# Patient Record
Sex: FEMALE | Race: WHITE | NOT HISPANIC OR LATINO | Employment: OTHER | URBAN - METROPOLITAN AREA
[De-identification: names, ages, dates, MRNs, and addresses within clinical notes are randomized per-mention and may not be internally consistent; named-entity substitution may affect disease eponyms.]

---

## 2023-04-14 ENCOUNTER — APPOINTMENT (OUTPATIENT)
Dept: PRIMARY CARE | Facility: CLINIC | Age: 63
End: 2023-04-14
Payer: COMMERCIAL

## 2023-04-22 PROBLEM — E03.9 HYPOTHYROIDISM: Status: ACTIVE | Noted: 2023-04-22

## 2023-04-22 PROBLEM — M54.16 LUMBAR RADICULOPATHY: Status: ACTIVE | Noted: 2023-04-22

## 2023-04-22 PROBLEM — M41.9 SCOLIOSIS: Status: ACTIVE | Noted: 2023-04-22

## 2023-04-22 PROBLEM — M43.10 SPONDYLOLISTHESIS, ACQUIRED: Status: ACTIVE | Noted: 2023-04-22

## 2023-04-22 PROBLEM — G90.50 RSD (REFLEX SYMPATHETIC DYSTROPHY): Status: ACTIVE | Noted: 2023-04-22

## 2023-04-22 PROBLEM — G56.00 CARPAL TUNNEL SYNDROME: Status: ACTIVE | Noted: 2023-04-22

## 2023-04-22 PROBLEM — F31.10 BIPOLAR DISORDER, CURRENT EPISODE MANIC WITHOUT PSYCHOTIC FEATURES (MULTI): Status: ACTIVE | Noted: 2023-04-22

## 2023-04-22 PROBLEM — M47.816 LUMBAR SPONDYLOSIS: Status: ACTIVE | Noted: 2023-04-22

## 2023-04-22 PROBLEM — R51.9 HEADACHE: Status: ACTIVE | Noted: 2023-04-22

## 2023-04-22 PROBLEM — I95.9 HYPOTENSION: Status: ACTIVE | Noted: 2023-04-22

## 2023-04-22 PROBLEM — G40.909 SEIZURE DISORDER (MULTI): Status: ACTIVE | Noted: 2023-04-22

## 2023-04-22 PROBLEM — R26.9 GAIT DISTURBANCE: Status: ACTIVE | Noted: 2023-04-22

## 2023-04-22 PROBLEM — R53.83 MALAISE AND FATIGUE: Status: ACTIVE | Noted: 2023-04-22

## 2023-04-22 PROBLEM — G90.529 COMPLEX REGIONAL PAIN SYNDROME OF LOWER EXTREMITY: Status: ACTIVE | Noted: 2023-04-22

## 2023-04-22 PROBLEM — D51.0 ANEMIA, PERNICIOUS: Status: ACTIVE | Noted: 2023-04-22

## 2023-04-22 PROBLEM — L40.9 PSORIASIS: Status: ACTIVE | Noted: 2023-04-22

## 2023-04-22 PROBLEM — R29.6 FALLS FREQUENTLY: Status: ACTIVE | Noted: 2023-04-22

## 2023-04-22 PROBLEM — R53.81 MALAISE AND FATIGUE: Status: ACTIVE | Noted: 2023-04-22

## 2023-04-22 PROBLEM — N28.9 RENAL DYSFUNCTION: Status: ACTIVE | Noted: 2023-04-22

## 2023-04-22 PROBLEM — M54.50 LUMBAGO: Status: ACTIVE | Noted: 2023-04-22

## 2023-04-22 RX ORDER — TIZANIDINE 4 MG/1
TABLET ORAL
COMMUNITY
Start: 2015-08-22

## 2023-04-22 RX ORDER — MIRTAZAPINE 15 MG/1
TABLET, FILM COATED ORAL
COMMUNITY
Start: 2022-06-19 | End: 2023-04-25 | Stop reason: SDUPTHER

## 2023-04-22 RX ORDER — LEVOTHYROXINE SODIUM 25 UG/1
1 TABLET ORAL DAILY
COMMUNITY
Start: 2023-04-06 | End: 2023-04-25 | Stop reason: SDUPTHER

## 2023-04-22 RX ORDER — GABAPENTIN 100 MG/1
CAPSULE ORAL
COMMUNITY
Start: 2022-06-19 | End: 2023-04-25 | Stop reason: SDUPTHER

## 2023-04-22 RX ORDER — DIVALPROEX SODIUM 250 MG/1
1 TABLET, DELAYED RELEASE ORAL 3 TIMES DAILY
COMMUNITY
Start: 2022-07-22 | End: 2023-04-25 | Stop reason: SDUPTHER

## 2023-04-22 RX ORDER — DIAZEPAM 10 MG/1
1 TABLET ORAL 2 TIMES DAILY
COMMUNITY
Start: 2019-12-13

## 2023-04-22 RX ORDER — BUSPIRONE HYDROCHLORIDE 10 MG/1
TABLET ORAL 2 TIMES DAILY
COMMUNITY
End: 2024-01-05 | Stop reason: WASHOUT

## 2023-04-22 RX ORDER — TRAZODONE HYDROCHLORIDE 50 MG/1
50 TABLET ORAL
COMMUNITY
End: 2023-04-25 | Stop reason: ALTCHOICE

## 2023-04-22 RX ORDER — COLCHICINE 0.6 MG/1
1 TABLET ORAL DAILY
COMMUNITY
Start: 2023-04-06 | End: 2023-07-24

## 2023-04-22 RX ORDER — LEVOTHYROXINE SODIUM 75 UG/1
1 TABLET ORAL DAILY
COMMUNITY
Start: 2019-08-23 | End: 2023-09-05 | Stop reason: SDUPTHER

## 2023-04-22 RX ORDER — MIDODRINE HYDROCHLORIDE 10 MG/1
1 TABLET ORAL 3 TIMES DAILY
COMMUNITY
Start: 2022-07-22 | End: 2024-05-16 | Stop reason: ALTCHOICE

## 2023-04-22 RX ORDER — TRAMADOL HYDROCHLORIDE 50 MG/1
50 TABLET ORAL
COMMUNITY
End: 2023-04-25 | Stop reason: ALTCHOICE

## 2023-04-22 RX ORDER — QUETIAPINE FUMARATE 25 MG/1
1 TABLET, FILM COATED ORAL DAILY
COMMUNITY
Start: 2023-04-06 | End: 2023-04-25 | Stop reason: SDUPTHER

## 2023-04-22 RX ORDER — DIVALPROEX SODIUM 125 MG/1
250 CAPSULE, COATED PELLETS ORAL EVERY 8 HOURS
COMMUNITY
Start: 2022-06-19 | End: 2024-01-05 | Stop reason: SDUPTHER

## 2023-04-22 RX ORDER — ARIPIPRAZOLE 5 MG/1
TABLET ORAL
COMMUNITY
End: 2023-04-25 | Stop reason: ALTCHOICE

## 2023-04-25 ENCOUNTER — LAB (OUTPATIENT)
Dept: LAB | Facility: LAB | Age: 63
End: 2023-04-25
Payer: COMMERCIAL

## 2023-04-25 ENCOUNTER — OFFICE VISIT (OUTPATIENT)
Dept: PRIMARY CARE | Facility: CLINIC | Age: 63
End: 2023-04-25
Payer: COMMERCIAL

## 2023-04-25 VITALS
BODY MASS INDEX: 29.71 KG/M2 | DIASTOLIC BLOOD PRESSURE: 71 MMHG | WEIGHT: 174 LBS | HEIGHT: 64 IN | SYSTOLIC BLOOD PRESSURE: 102 MMHG

## 2023-04-25 DIAGNOSIS — R26.9 GAIT DISTURBANCE: ICD-10-CM

## 2023-04-25 DIAGNOSIS — E03.9 HYPOTHYROIDISM, UNSPECIFIED TYPE: ICD-10-CM

## 2023-04-25 DIAGNOSIS — F31.10 BIPOLAR DISORDER, CURRENT EPISODE MANIC WITHOUT PSYCHOTIC FEATURES (MULTI): ICD-10-CM

## 2023-04-25 DIAGNOSIS — G40.909 SEIZURE DISORDER (MULTI): ICD-10-CM

## 2023-04-25 DIAGNOSIS — G90.50 RSD (REFLEX SYMPATHETIC DYSTROPHY): Primary | ICD-10-CM

## 2023-04-25 DIAGNOSIS — G93.40 ENCEPHALOPATHY: ICD-10-CM

## 2023-04-25 LAB
ALANINE AMINOTRANSFERASE (SGPT) (U/L) IN SER/PLAS: 5 U/L (ref 7–45)
ALBUMIN (G/DL) IN SER/PLAS: 4.5 G/DL (ref 3.4–5)
ALKALINE PHOSPHATASE (U/L) IN SER/PLAS: 66 U/L (ref 33–136)
ANION GAP IN SER/PLAS: 17 MMOL/L (ref 10–20)
ASPARTATE AMINOTRANSFERASE (SGOT) (U/L) IN SER/PLAS: 15 U/L (ref 9–39)
BILIRUBIN TOTAL (MG/DL) IN SER/PLAS: 0.6 MG/DL (ref 0–1.2)
CALCIUM (MG/DL) IN SER/PLAS: 9.7 MG/DL (ref 8.6–10.6)
CARBON DIOXIDE, TOTAL (MMOL/L) IN SER/PLAS: 25 MMOL/L (ref 21–32)
CHLORIDE (MMOL/L) IN SER/PLAS: 103 MMOL/L (ref 98–107)
CREATININE (MG/DL) IN SER/PLAS: 1.2 MG/DL (ref 0.5–1.05)
ERYTHROCYTE DISTRIBUTION WIDTH (RATIO) BY AUTOMATED COUNT: 13.5 % (ref 11.5–14.5)
ERYTHROCYTE MEAN CORPUSCULAR HEMOGLOBIN CONCENTRATION (G/DL) BY AUTOMATED: 31.3 G/DL (ref 32–36)
ERYTHROCYTE MEAN CORPUSCULAR VOLUME (FL) BY AUTOMATED COUNT: 102 FL (ref 80–100)
ERYTHROCYTES (10*6/UL) IN BLOOD BY AUTOMATED COUNT: 3.93 X10E12/L (ref 4–5.2)
GFR FEMALE: 51 ML/MIN/1.73M2
GLUCOSE (MG/DL) IN SER/PLAS: 82 MG/DL (ref 74–99)
HEMATOCRIT (%) IN BLOOD BY AUTOMATED COUNT: 39.9 % (ref 36–46)
HEMOGLOBIN (G/DL) IN BLOOD: 12.5 G/DL (ref 12–16)
LEUKOCYTES (10*3/UL) IN BLOOD BY AUTOMATED COUNT: 7.2 X10E9/L (ref 4.4–11.3)
NRBC (PER 100 WBCS) BY AUTOMATED COUNT: 0 /100 WBC (ref 0–0)
PLATELETS (10*3/UL) IN BLOOD AUTOMATED COUNT: 265 X10E9/L (ref 150–450)
POTASSIUM (MMOL/L) IN SER/PLAS: 4.8 MMOL/L (ref 3.5–5.3)
PROTEIN TOTAL: 7.4 G/DL (ref 6.4–8.2)
SODIUM (MMOL/L) IN SER/PLAS: 140 MMOL/L (ref 136–145)
THYROTROPIN (MIU/L) IN SER/PLAS BY DETECTION LIMIT <= 0.05 MIU/L: 2.49 MIU/L (ref 0.44–3.98)
UREA NITROGEN (MG/DL) IN SER/PLAS: 31 MG/DL (ref 6–23)

## 2023-04-25 PROCEDURE — 85027 COMPLETE CBC AUTOMATED: CPT

## 2023-04-25 PROCEDURE — 36415 COLL VENOUS BLD VENIPUNCTURE: CPT

## 2023-04-25 PROCEDURE — 80053 COMPREHEN METABOLIC PANEL: CPT

## 2023-04-25 PROCEDURE — 3008F BODY MASS INDEX DOCD: CPT | Performed by: INTERNAL MEDICINE

## 2023-04-25 PROCEDURE — 84443 ASSAY THYROID STIM HORMONE: CPT

## 2023-04-25 PROCEDURE — 99215 OFFICE O/P EST HI 40 MIN: CPT | Performed by: INTERNAL MEDICINE

## 2023-04-25 RX ORDER — MIRTAZAPINE 15 MG/1
15 TABLET, FILM COATED ORAL NIGHTLY
Qty: 30 TABLET | Refills: 0 | Status: SHIPPED | OUTPATIENT
Start: 2023-04-25 | End: 2024-01-05 | Stop reason: WASHOUT

## 2023-04-25 RX ORDER — DIVALPROEX SODIUM 250 MG/1
250 TABLET, DELAYED RELEASE ORAL 3 TIMES DAILY
Qty: 90 TABLET | Refills: 1 | Status: SHIPPED | OUTPATIENT
Start: 2023-04-25 | End: 2023-06-13

## 2023-04-25 RX ORDER — QUETIAPINE FUMARATE 25 MG/1
25 TABLET, FILM COATED ORAL DAILY
Qty: 30 TABLET | Refills: 0 | Status: SHIPPED | OUTPATIENT
Start: 2023-04-25 | End: 2024-04-10 | Stop reason: WASHOUT

## 2023-04-25 RX ORDER — GABAPENTIN 100 MG/1
100 CAPSULE ORAL 3 TIMES DAILY
Qty: 90 CAPSULE | Refills: 0 | Status: SHIPPED | OUTPATIENT
Start: 2023-04-25 | End: 2023-06-13

## 2023-04-25 RX ORDER — LEVOTHYROXINE SODIUM 25 UG/1
25 TABLET ORAL DAILY
Qty: 30 TABLET | Refills: 1 | Status: SHIPPED | OUTPATIENT
Start: 2023-04-25 | End: 2024-01-05 | Stop reason: ALTCHOICE

## 2023-04-25 ASSESSMENT — ENCOUNTER SYMPTOMS
HEADACHES: 0
SPEECH DIFFICULTY: 0
NECK PAIN: 0
WEAKNESS: 0
CHILLS: 0
ABDOMINAL PAIN: 0
COUGH: 0
DIFFICULTY URINATING: 0
DIARRHEA: 0
FREQUENCY: 0
TREMORS: 0
BACK PAIN: 0
SLEEP DISTURBANCE: 0
PALPITATIONS: 0
SHORTNESS OF BREATH: 0
ARTHRALGIAS: 0
BLOOD IN STOOL: 0
FEVER: 0
CONSTIPATION: 0
FATIGUE: 1
MYALGIAS: 0
DIZZINESS: 0

## 2023-04-25 NOTE — PATIENT INSTRUCTIONS
Seen today for recent hospitalization for encephalopathy  Your medications have been changed.  New medications have been sent to your mail-in pharmacy.  Follow-up with your PCP in 1 to 2 months  Follow-up with neurology in 1 month  Follow-up with your psychiatrist in 1 month

## 2023-04-25 NOTE — PROGRESS NOTES
Subjective   Patient ID: Corinne L Tanks is a 63 y.o. female.    HPI patient is seen today for follow-up after hospitalization.  Her medical history significant for bipolar disorder, cervical canal stenosis, seizures, gait imbalance.  She went to the ER after being unresponsive found by staff.  There was a concern for polypharmacy/serotonin syndrome given patient was on multiple sedating medications.  In the hospital her medications were reviewed and changed.   She was found to have cogwheel rigidity and tremors possibly due to Abilify which was later discontinued and she was started on Seroquel.  Her discharge medications are-divalproex 125 mg - 2 capsule every 8 hours, gabapentin 100 mg 3 times a day, mirtazapine 50 mg at bedtime, quetiapine 25 mg once a day, levothyroxine 25 mcg once a day.    Review of Systems   Constitutional:  Positive for fatigue. Negative for chills and fever.   Respiratory:  Negative for cough and shortness of breath.    Cardiovascular:  Negative for chest pain, palpitations and leg swelling.   Gastrointestinal:  Negative for abdominal pain, blood in stool, constipation and diarrhea.   Endocrine: Negative for cold intolerance and heat intolerance.   Genitourinary:  Negative for difficulty urinating, frequency and urgency.   Musculoskeletal:  Negative for arthralgias, back pain, myalgias and neck pain.   Neurological:  Negative for dizziness, tremors, speech difficulty, weakness and headaches.   Psychiatric/Behavioral:  Negative for sleep disturbance.        Objective   Physical Exam  Vitals reviewed.   Constitutional:       General: She is not in acute distress.     Appearance: Normal appearance.   HENT:      Head: Normocephalic and atraumatic.      Mouth/Throat:      Mouth: Mucous membranes are moist.   Eyes:      Extraocular Movements: Extraocular movements intact.      Conjunctiva/sclera: Conjunctivae normal.      Pupils: Pupils are equal, round, and reactive to light.   Cardiovascular:       Rate and Rhythm: Normal rate and regular rhythm.      Pulses: Normal pulses.   Pulmonary:      Effort: Pulmonary effort is normal. No respiratory distress.      Breath sounds: Normal breath sounds.   Abdominal:      General: Bowel sounds are normal. There is no distension.      Tenderness: There is no abdominal tenderness.   Musculoskeletal:         General: No swelling or tenderness. Normal range of motion.      Cervical back: Normal range of motion.   Skin:     General: Skin is warm.   Neurological:      General: No focal deficit present.      Mental Status: She is alert.      Motor: Weakness present.      Coordination: Coordination normal.      Gait: Gait abnormal.   Psychiatric:         Mood and Affect: Mood normal.         Behavior: Behavior normal.         Assessment/Plan   Diagnoses and all orders for this visit:  RSD (reflex sympathetic dystrophy)  -     gabapentin (Neurontin) 100 mg capsule; Take 1 capsule (100 mg) by mouth in the morning and 1 capsule (100 mg) in the evening and 1 capsule (100 mg) before bedtime.  Hypothyroidism, unspecified type  Comments:  Currently on levothyroxine 25 mcg daily  Orders:  -     levothyroxine (Synthroid, Levoxyl) 25 mcg tablet; Take 1 tablet (25 mcg) by mouth once daily.  -     TSH with reflex to Free T4 if abnormal; Future  Bipolar disorder, current episode manic without psychotic features (CMS/[facility])  Comments:  Continue with current pain  Patient has appointment to see her psychiatrist  Orders:  -     mirtazapine (Remeron) 15 mg tablet; Take 1 tablet (15 mg) by mouth once daily at bedtime.  -     QUEtiapine (SEROquel) 25 mg tablet; Take 1 tablet (25 mg) by mouth once daily.  Gait disturbance  Comments:  Continue using walker  Fall and safety precautions  Seizure disorder (CMS/HCC)  Comments:  Continue with divalproex 250 mg every 8 hours  Patient has appointment to see neurology next month  Orders:  -     divalproex (Depakote) 250 mg EC tablet; Take 1 tablet (250  mg) by mouth in the morning and 1 tablet (250 mg) in the evening and 1 tablet (250 mg) before bedtime.  -     CBC; Future  -     Comprehensive Metabolic Panel; Future  Encephalopathy  Comments:  Recent hospitalization due to encephalopathy possibly due to overdose of her regular medications  Medications weaned down

## 2023-04-28 ENCOUNTER — TELEPHONE (OUTPATIENT)
Dept: PRIMARY CARE | Facility: CLINIC | Age: 63
End: 2023-04-28
Payer: COMMERCIAL

## 2023-06-12 DIAGNOSIS — G90.50 RSD (REFLEX SYMPATHETIC DYSTROPHY): ICD-10-CM

## 2023-06-12 DIAGNOSIS — G40.909 SEIZURE DISORDER (MULTI): ICD-10-CM

## 2023-06-13 RX ORDER — GABAPENTIN 100 MG/1
CAPSULE ORAL
Qty: 21 CAPSULE | Status: SHIPPED | OUTPATIENT
Start: 2023-06-13 | End: 2024-01-05 | Stop reason: SDUPTHER

## 2023-06-13 RX ORDER — DIVALPROEX SODIUM 250 MG/1
TABLET, DELAYED RELEASE ORAL
Qty: 21 TABLET | Status: SHIPPED | OUTPATIENT
Start: 2023-06-13 | End: 2024-01-05 | Stop reason: WASHOUT

## 2023-07-24 DIAGNOSIS — M10.9 ACUTE GOUT, UNSPECIFIED CAUSE, UNSPECIFIED SITE: Primary | ICD-10-CM

## 2023-07-24 RX ORDER — COLCHICINE 0.6 MG/1
TABLET ORAL
Qty: 7 TABLET | Refills: 11 | Status: SHIPPED | OUTPATIENT
Start: 2023-07-24 | End: 2023-11-07

## 2023-09-05 DIAGNOSIS — E03.9 HYPOTHYROIDISM, UNSPECIFIED TYPE: ICD-10-CM

## 2023-09-05 RX ORDER — LEVOTHYROXINE SODIUM 75 UG/1
75 TABLET ORAL DAILY
Qty: 90 TABLET | Refills: 3 | Status: SHIPPED | OUTPATIENT
Start: 2023-09-05 | End: 2024-01-05 | Stop reason: ALTCHOICE

## 2023-09-08 DIAGNOSIS — E03.9 HYPOTHYROIDISM, UNSPECIFIED TYPE: ICD-10-CM

## 2023-09-11 RX ORDER — MIDODRINE HYDROCHLORIDE 10 MG/1
TABLET ORAL
Qty: 21 TABLET | OUTPATIENT
Start: 2023-09-11

## 2023-10-19 ENCOUNTER — TELEPHONE (OUTPATIENT)
Dept: PRIMARY CARE | Facility: CLINIC | Age: 63
End: 2023-10-19
Payer: COMMERCIAL

## 2023-10-19 NOTE — TELEPHONE ENCOUNTER
Patient is in Assistant Living where they are making her walk down to the nurses station for her medication. She states that her neuropathy is so bad that she can't do it even with her Rolator. She would like for us to call the assistant living facility and inform them she an not walk to the nursing station for her meds 3 to 4 times a day. While you were out Dr. Pandey but her down too 100mg of her Gabapentin and she doesn't know why and thinks she should go back to her original dosage.     [facility] in [place].

## 2023-10-20 NOTE — TELEPHONE ENCOUNTER
I have not seen this patient in almost a year, Dr. Pandey did not change anything she was discharged from the hospital over the summer with this medication dose.  To make medication adjustments I am not comfortable doing it until I have a chance to reevaluate the patient.  She has an appointment coming up it seems in November that will be reviewed at that time

## 2023-10-25 NOTE — TELEPHONE ENCOUNTER
[facility] wanted to inform us that Patient fell leaving the building with no injuries.

## 2023-11-06 DIAGNOSIS — M10.9 ACUTE GOUT, UNSPECIFIED CAUSE, UNSPECIFIED SITE: ICD-10-CM

## 2023-11-07 RX ORDER — COLCHICINE 0.6 MG/1
TABLET ORAL
Qty: 30 TABLET | Refills: 2 | Status: SHIPPED | OUTPATIENT
Start: 2023-11-07 | End: 2024-02-08 | Stop reason: SDUPTHER

## 2023-11-16 ENCOUNTER — APPOINTMENT (OUTPATIENT)
Dept: PRIMARY CARE | Facility: CLINIC | Age: 63
End: 2023-11-16
Payer: COMMERCIAL

## 2023-12-07 ENCOUNTER — TELEPHONE (OUTPATIENT)
Dept: PRIMARY CARE | Facility: CLINIC | Age: 63
End: 2023-12-07
Payer: COMMERCIAL

## 2023-12-07 NOTE — TELEPHONE ENCOUNTER
Patient is requesting a letter for her Assistant Living Home for her meals and medication be brought up to her instead of walking to them. She is in extreme pain and can't walk down.

## 2023-12-11 DIAGNOSIS — G40.909 SEIZURE DISORDER (MULTI): Primary | ICD-10-CM

## 2023-12-12 RX ORDER — LAMOTRIGINE 25 MG/1
TABLET ORAL
Qty: 60 TABLET | Refills: 1 | Status: SHIPPED | OUTPATIENT
Start: 2023-12-12 | End: 2024-01-05 | Stop reason: ALTCHOICE

## 2023-12-14 NOTE — TELEPHONE ENCOUNTER
Patient has not been seen in the year and wants to actually see her at her upcoming appointment before a feel comfortable providing a letter certifying a special accommodation.  I certainly empathize with the idea that she does not want to be an unnecessary pain but I worry that constant immobility will make her weaker and decline faster as well.  There may be other ways we can intervene to compensate for this issue.

## 2023-12-22 RX ORDER — LURASIDONE HYDROCHLORIDE 20 MG/1
20 TABLET, FILM COATED ORAL
COMMUNITY
Start: 2023-12-13 | End: 2024-01-05 | Stop reason: SDUPTHER

## 2023-12-25 RX ORDER — LURASIDONE HYDROCHLORIDE 20 MG/1
20 TABLET, FILM COATED ORAL
Qty: 30 TABLET | Refills: 11 | OUTPATIENT
Start: 2023-12-25 | End: 2024-12-19

## 2024-01-02 RX ORDER — LURASIDONE HYDROCHLORIDE 20 MG/1
TABLET, FILM COATED ORAL
Qty: 30 TABLET | Refills: 11 | OUTPATIENT
Start: 2024-01-02

## 2024-01-05 ENCOUNTER — PHARMACY VISIT (OUTPATIENT)
Dept: PHARMACY | Facility: CLINIC | Age: 64
End: 2024-01-05
Payer: COMMERCIAL

## 2024-01-05 ENCOUNTER — LAB (OUTPATIENT)
Dept: LAB | Facility: LAB | Age: 64
End: 2024-01-05
Payer: COMMERCIAL

## 2024-01-05 ENCOUNTER — OFFICE VISIT (OUTPATIENT)
Dept: PRIMARY CARE | Facility: CLINIC | Age: 64
End: 2024-01-05
Payer: COMMERCIAL

## 2024-01-05 VITALS
HEART RATE: 98 BPM | SYSTOLIC BLOOD PRESSURE: 125 MMHG | DIASTOLIC BLOOD PRESSURE: 79 MMHG | WEIGHT: 129.6 LBS | BODY MASS INDEX: 22.13 KG/M2 | HEIGHT: 64 IN | TEMPERATURE: 98 F

## 2024-01-05 DIAGNOSIS — L20.84 INTRINSIC ECZEMA: ICD-10-CM

## 2024-01-05 DIAGNOSIS — R26.9 GAIT DISTURBANCE: ICD-10-CM

## 2024-01-05 DIAGNOSIS — G40.909 SEIZURE DISORDER (MULTI): ICD-10-CM

## 2024-01-05 DIAGNOSIS — Z51.81 MEDICATION MONITORING ENCOUNTER: ICD-10-CM

## 2024-01-05 DIAGNOSIS — H69.93 CHRONIC DYSFUNCTION OF BOTH EUSTACHIAN TUBES: ICD-10-CM

## 2024-01-05 DIAGNOSIS — E03.9 HYPOTHYROIDISM, UNSPECIFIED TYPE: ICD-10-CM

## 2024-01-05 DIAGNOSIS — G90.50 RSD (REFLEX SYMPATHETIC DYSTROPHY): Primary | ICD-10-CM

## 2024-01-05 DIAGNOSIS — H53.9 VISION CHANGES: ICD-10-CM

## 2024-01-05 LAB
ALBUMIN SERPL BCP-MCNC: 4.3 G/DL (ref 3.4–5)
ALP SERPL-CCNC: 60 U/L (ref 33–136)
ALT SERPL W P-5'-P-CCNC: 13 U/L (ref 7–45)
ANION GAP SERPL CALC-SCNC: 13 MMOL/L (ref 10–20)
AST SERPL W P-5'-P-CCNC: 22 U/L (ref 9–39)
BILIRUB SERPL-MCNC: 0.4 MG/DL (ref 0–1.2)
BUN SERPL-MCNC: 30 MG/DL (ref 6–23)
CALCIUM SERPL-MCNC: 9.7 MG/DL (ref 8.6–10.6)
CHLORIDE SERPL-SCNC: 103 MMOL/L (ref 98–107)
CO2 SERPL-SCNC: 29 MMOL/L (ref 21–32)
CREAT SERPL-MCNC: 1.17 MG/DL (ref 0.5–1.05)
ERYTHROCYTE [DISTWIDTH] IN BLOOD BY AUTOMATED COUNT: 13.3 % (ref 11.5–14.5)
GFR SERPL CREATININE-BSD FRML MDRD: 53 ML/MIN/1.73M*2
GLUCOSE SERPL-MCNC: 83 MG/DL (ref 74–99)
HCT VFR BLD AUTO: 35.6 % (ref 36–46)
HGB BLD-MCNC: 11.2 G/DL (ref 12–16)
MCH RBC QN AUTO: 33.5 PG (ref 26–34)
MCHC RBC AUTO-ENTMCNC: 31.5 G/DL (ref 32–36)
MCV RBC AUTO: 107 FL (ref 80–100)
NRBC BLD-RTO: 0 /100 WBCS (ref 0–0)
PLATELET # BLD AUTO: 187 X10*3/UL (ref 150–450)
POTASSIUM SERPL-SCNC: 5.1 MMOL/L (ref 3.5–5.3)
PROT SERPL-MCNC: 6.6 G/DL (ref 6.4–8.2)
RBC # BLD AUTO: 3.34 X10*6/UL (ref 4–5.2)
SODIUM SERPL-SCNC: 140 MMOL/L (ref 136–145)
T4 FREE SERPL-MCNC: 1.16 NG/DL (ref 0.78–1.48)
TSH SERPL-ACNC: 0.07 MIU/L (ref 0.44–3.98)
VALPROATE SERPL-MCNC: 80 UG/ML (ref 50–100)
WBC # BLD AUTO: 7.9 X10*3/UL (ref 4.4–11.3)

## 2024-01-05 PROCEDURE — 84443 ASSAY THYROID STIM HORMONE: CPT

## 2024-01-05 PROCEDURE — 1036F TOBACCO NON-USER: CPT | Performed by: INTERNAL MEDICINE

## 2024-01-05 PROCEDURE — 85027 COMPLETE CBC AUTOMATED: CPT

## 2024-01-05 PROCEDURE — 84439 ASSAY OF FREE THYROXINE: CPT

## 2024-01-05 PROCEDURE — 80164 ASSAY DIPROPYLACETIC ACD TOT: CPT

## 2024-01-05 PROCEDURE — RXMED WILLOW AMBULATORY MEDICATION CHARGE

## 2024-01-05 PROCEDURE — 80053 COMPREHEN METABOLIC PANEL: CPT

## 2024-01-05 PROCEDURE — 3008F BODY MASS INDEX DOCD: CPT | Performed by: INTERNAL MEDICINE

## 2024-01-05 PROCEDURE — 99214 OFFICE O/P EST MOD 30 MIN: CPT | Performed by: INTERNAL MEDICINE

## 2024-01-05 PROCEDURE — 36415 COLL VENOUS BLD VENIPUNCTURE: CPT

## 2024-01-05 RX ORDER — LEVOTHYROXINE SODIUM 25 UG/1
25 TABLET ORAL DAILY
Qty: 90 TABLET | Refills: 3 | Status: SHIPPED | OUTPATIENT
Start: 2024-01-05 | End: 2025-01-04

## 2024-01-05 RX ORDER — CLOBETASOL PROPIONATE 0.46 MG/ML
SOLUTION TOPICAL 2 TIMES DAILY
Qty: 60 ML | Refills: 2 | Status: SHIPPED | OUTPATIENT
Start: 2024-01-05 | End: 2024-05-16 | Stop reason: SDUPTHER

## 2024-01-05 RX ORDER — CLOBETASOL PROPIONATE 0.5 MG/G
OINTMENT TOPICAL 2 TIMES DAILY
Qty: 60 G | Refills: 2 | Status: SHIPPED | OUTPATIENT
Start: 2024-01-05 | End: 2024-04-04 | Stop reason: SDUPTHER

## 2024-01-05 RX ORDER — DIVALPROEX SODIUM 125 MG/1
250 CAPSULE, COATED PELLETS ORAL EVERY 8 HOURS
Qty: 540 CAPSULE | Refills: 3 | Status: SHIPPED | OUTPATIENT
Start: 2024-01-05 | End: 2025-01-04

## 2024-01-05 RX ORDER — GABAPENTIN 300 MG/1
CAPSULE ORAL
Qty: 298 CAPSULE | Refills: 0 | Status: SHIPPED | OUTPATIENT
Start: 2024-01-05 | End: 2024-03-28 | Stop reason: SDUPTHER

## 2024-01-05 RX ORDER — LURASIDONE HYDROCHLORIDE 20 MG/1
20 TABLET, FILM COATED ORAL
COMMUNITY

## 2024-01-05 RX ORDER — FLUTICASONE PROPIONATE 50 MCG
1 SPRAY, SUSPENSION (ML) NASAL DAILY
Qty: 16 G | Refills: 5 | Status: SHIPPED | OUTPATIENT
Start: 2024-01-05 | End: 2025-01-04

## 2024-01-05 ASSESSMENT — ENCOUNTER SYMPTOMS
CHILLS: 0
POLYDIPSIA: 0
PALPITATIONS: 0
SHORTNESS OF BREATH: 0
FEVER: 0
COUGH: 0

## 2024-01-05 NOTE — LETTER
February 5, 2024     Corinne L Tanks  [address]  [place] [address]      Dear Ms. Barnhart:    Below are the results from your recent visit:    Based on the patient's blood work I have no need to change dosing on medication at this time.  Based on the current medication list that means we would stay on the levothyroxine 0.25 daily as well as the same doses of her seizure medication.  Please keep the appointment for March for follow-up.     Resulted Orders   CBC   Result Value Ref Range    WBC 7.9 4.4 - 11.3 x10*3/uL    nRBC 0.0 0.0 - 0.0 /100 WBCs    RBC 3.34 (L) 4.00 - 5.20 x10*6/uL    Hemoglobin 11.2 (L) 12.0 - 16.0 g/dL    Hematocrit 35.6 (L) 36.0 - 46.0 %     (H) 80 - 100 fL    MCH 33.5 26.0 - 34.0 pg    MCHC 31.5 (L) 32.0 - 36.0 g/dL    RDW 13.3 11.5 - 14.5 %    Platelets 187 150 - 450 x10*3/uL   Comprehensive Metabolic Panel   Result Value Ref Range    Glucose 83 74 - 99 mg/dL    Sodium 140 136 - 145 mmol/L    Potassium 5.1 3.5 - 5.3 mmol/L    Chloride 103 98 - 107 mmol/L    Bicarbonate 29 21 - 32 mmol/L    Anion Gap 13 10 - 20 mmol/L    Urea Nitrogen 30 (H) 6 - 23 mg/dL    Creatinine 1.17 (H) 0.50 - 1.05 mg/dL    eGFR 53 (L) >60 mL/min/1.73m*2      Comment:      Calculations of estimated GFR are performed using the 2021 CKD-EPI Study Refit equation without the race variable for the IDMS-Traceable creatinine methods.  https://jasn.asnjournals.org/content/early/2021/09/22/ASN.1455697028    Calcium 9.7 8.6 - 10.6 mg/dL    Albumin 4.3 3.4 - 5.0 g/dL    Alkaline Phosphatase 60 33 - 136 U/L    Total Protein 6.6 6.4 - 8.2 g/dL    AST 22 9 - 39 U/L    Bilirubin, Total 0.4 0.0 - 1.2 mg/dL    ALT 13 7 - 45 U/L      Comment:      Patients treated with Sulfasalazine may generate falsely decreased results for ALT.   TSH with reflex to Free T4 if abnormal   Result Value Ref Range    Thyroid Stimulating Hormone 0.07 (L) 0.44 - 3.98 mIU/L    Narrative    TSH testing is performed using different testing  methodology at [facility] than at other [facility]. Direct result comparisons should only be made within the same method.     Valproic Acid   Result Value Ref Range    Valproic Acid 80 50 - 100 ug/mL   Thyroxine, Free   Result Value Ref Range    Thyroxine, Free 1.16 0.78 - 1.48 ng/dL    Narrative    Thyroxine Free testing is performed using different testing methodology at [facility] than at other [facility]. Direct result comparisons should only be made within the same method.     If you have any questions or concerns, please don't hesitate to call.         Sincerely,        MD Mariya Laguna MA

## 2024-01-05 NOTE — PROGRESS NOTES
Subjective   Patient ID: Corinne L Tanks is a 63 y.o. female who presents for Urinary Frequency (Hands and feet sticking Together/), exzema (Severe Brandon josh/ Pain), and Earache.    63-year-old female presents today for significantly overdue follow-up.  She was last seen by me a little over 1 year ago.  Since the interval of her last visit she had 1 hospitalization in March 2023 in which she was seen for altered mental status and medication adjustments were made to her psychiatric medications as a result.  Remainder of the workup was negative.  Since that visit they have also reduced her gabapentin, this was completed as part of the hospitalization and then carried forward by one of my partners but her chronic neuropathy pain has been significantly worse since that time.  She is interested in attempting titration now that there is been no recurring issues and her other psychiatric meds have been changed without concern.  She does report that her major depressive disorder is significantly improved with the addition of Latuda at this time.    A letter was provided for medical accommodation at her assisted living based on her chronic health issues    I adjusted her gabapentin with titration and recommended close follow-up in 2 months    I updated her seizure monitoring blood work she will complete that with her other blood testing at this time.  There has been no seizure-like activity witnessed or reported within the last 1 year.  The hospitalization back in March was determined to be negative for seizure-like activity as a source of altered mental status.    I performed a detailed review of the patient's medical chart and medications given the overdue follow-up and numerous changes that it happened since her last encounter.  Today's medical record should reflect the accurate up-to-date medication list    Chronic eczema worse in the last 2 months with colder weather and winter.  Treatment reinitiated     mild vision  "progression.  Patient advised about follow-up with eye doctor referral provided         Review of Systems   Constitutional:  Negative for chills and fever.   Respiratory:  Negative for cough and shortness of breath.    Cardiovascular:  Negative for chest pain and palpitations.   Endocrine: Negative for polydipsia and polyuria.       Objective   /79 (BP Location: Left arm, Patient Position: Sitting, BP Cuff Size: Adult)   Pulse 98   Temp 36.7 °C (98 °F) (Temporal)   Ht 1.626 m (5' 4\")   Wt 58.8 kg (129 lb 9.6 oz)   BMI 22.25 kg/m²     Physical Exam  Constitutional:       Appearance: Normal appearance.   HENT:      Head: Normocephalic and atraumatic.   Eyes:      Extraocular Movements: Extraocular movements intact.      Pupils: Pupils are equal, round, and reactive to light.   Neck:      Thyroid: No thyroid mass or thyromegaly.      Vascular: No carotid bruit.   Cardiovascular:      Rate and Rhythm: Normal rate and regular rhythm.      Heart sounds: No murmur heard.     No friction rub. No gallop.   Pulmonary:      Effort: No respiratory distress.      Breath sounds: No wheezing, rhonchi or rales.   Musculoskeletal:      Cervical back: Neck supple.      Right lower leg: No edema.      Left lower leg: No edema.   Lymphadenopathy:      Cervical: No cervical adenopathy.   Neurological:      Mental Status: She is alert.      Gait: Gait abnormal.      Comments: Low step height shuffling gait, assistance by walker.         Assessment/Plan   Problem List Items Addressed This Visit             ICD-10-CM    RSD (reflex sympathetic dystrophy) - Primary G90.50    Relevant Medications    gabapentin (Neurontin) 300 mg capsule    Gait disturbance R26.9    Hypothyroidism E03.9    Relevant Medications    levothyroxine (Synthroid, Levoxyl) 25 mcg tablet    Other Relevant Orders    CBC    Comprehensive Metabolic Panel    TSH with reflex to Free T4 if abnormal    Valproic Acid    Seizure disorder (CMS/[facility]) G40.909    Relevant " Medications    divalproex sprinkle (Depakote Sprinkle) 125 mg DR capsule    Other Relevant Orders    CBC    Comprehensive Metabolic Panel    TSH with reflex to Free T4 if abnormal    Valproic Acid     Other Visit Diagnoses         Codes    Intrinsic eczema     L20.84    Relevant Medications    clobetasol (Temovate) 0.05 % external solution    clobetasol (Temovate) 0.05 % ointment    Vision changes     H53.9    Relevant Orders    Referral to Ophthalmology    Medication monitoring encounter     Z51.81    Relevant Orders    CBC    Comprehensive Metabolic Panel    TSH with reflex to Free T4 if abnormal    Valproic Acid    Chronic dysfunction of both eustachian tubes     H69.93    Relevant Medications    fluticasone (Flonase) 50 mcg/actuation nasal spray

## 2024-01-05 NOTE — LETTER
To whom it may concern,    This is regarding my patient Corinne L. Tanks, date of birth January 8, 1960.  This letter is being provided by her medical doctor for the purposes of certifying the patient's chronic health issue of reflex sympathetic dystrophy, chronic neuropathic pain, chronic balance disorder and risk for falls for the purposes of specifying my recommendations for accommodation of these chronic health issues.  It is my medical opinion that she would benefit from a policy in which her medications and making meals for a day were brought to the patient and her assisted in her room as opposed to making her come to the nurses station or cafeteria on a daily basis.  My reasons for this include the patient's balance disorder and chronic pain get incredibly difficult for her to ambulate and put her at risk of falls.  She has been missing meals because she cannot make it to the cafeteria and time to the long walk.  As a result of missing meals her weight is declining in a problematic way and will risk further weakening and falls which is unacceptable.  We are working at this time to improve her chronic pain and she is currently in physical therapy for strengthening.  We may revise this recommendation in the future depending on her progress.  But at this time it is my medical opinion that her medications and 3 main meals of the day should be brought to the patient in her room for compliance and safety.    Thank you for your consideration in this matter     Sincerely,      Oneil Rincon MD

## 2024-01-08 NOTE — RESULT ENCOUNTER NOTE
Based on the patient's blood work I have no need to change dosing on medication at this time.  Based on the current medication list that means we would stay on the levothyroxine 0.25 daily as well as the same doses of her seizure medication.  Please keep the appointment for March for follow-up.

## 2024-01-15 PROCEDURE — RXMED WILLOW AMBULATORY MEDICATION CHARGE

## 2024-01-16 ENCOUNTER — PHARMACY VISIT (OUTPATIENT)
Dept: PHARMACY | Facility: CLINIC | Age: 64
End: 2024-01-16
Payer: COMMERCIAL

## 2024-01-25 PROCEDURE — RXMED WILLOW AMBULATORY MEDICATION CHARGE

## 2024-01-26 ENCOUNTER — PHARMACY VISIT (OUTPATIENT)
Dept: PHARMACY | Facility: CLINIC | Age: 64
End: 2024-01-26
Payer: COMMERCIAL

## 2024-01-29 ENCOUNTER — PHARMACY VISIT (OUTPATIENT)
Dept: PHARMACY | Facility: CLINIC | Age: 64
End: 2024-01-29

## 2024-01-29 PROCEDURE — RXMED WILLOW AMBULATORY MEDICATION CHARGE

## 2024-01-31 ENCOUNTER — PHARMACY VISIT (OUTPATIENT)
Dept: PHARMACY | Facility: CLINIC | Age: 64
End: 2024-01-31
Payer: COMMERCIAL

## 2024-02-01 ENCOUNTER — PHARMACY VISIT (OUTPATIENT)
Dept: PHARMACY | Facility: CLINIC | Age: 64
End: 2024-02-01

## 2024-02-08 DIAGNOSIS — M10.9 ACUTE GOUT, UNSPECIFIED CAUSE, UNSPECIFIED SITE: ICD-10-CM

## 2024-02-08 RX ORDER — COLCHICINE 0.6 MG/1
TABLET ORAL
Qty: 30 TABLET | Refills: 2 | Status: SHIPPED | OUTPATIENT
Start: 2024-02-08

## 2024-02-09 ENCOUNTER — PHARMACY VISIT (OUTPATIENT)
Dept: PHARMACY | Facility: CLINIC | Age: 64
End: 2024-02-09
Payer: COMMERCIAL

## 2024-02-09 PROCEDURE — RXMED WILLOW AMBULATORY MEDICATION CHARGE

## 2024-02-14 PROCEDURE — RXMED WILLOW AMBULATORY MEDICATION CHARGE

## 2024-02-15 ENCOUNTER — PHARMACY VISIT (OUTPATIENT)
Dept: PHARMACY | Facility: CLINIC | Age: 64
End: 2024-02-15
Payer: COMMERCIAL

## 2024-02-19 PROCEDURE — RXMED WILLOW AMBULATORY MEDICATION CHARGE

## 2024-02-21 PROCEDURE — RXMED WILLOW AMBULATORY MEDICATION CHARGE

## 2024-02-22 ENCOUNTER — PHARMACY VISIT (OUTPATIENT)
Dept: PHARMACY | Facility: CLINIC | Age: 64
End: 2024-02-22
Payer: COMMERCIAL

## 2024-03-09 PROCEDURE — RXMED WILLOW AMBULATORY MEDICATION CHARGE

## 2024-03-11 ENCOUNTER — APPOINTMENT (OUTPATIENT)
Dept: RHEUMATOLOGY | Facility: CLINIC | Age: 64
End: 2024-03-11
Payer: COMMERCIAL

## 2024-03-14 ENCOUNTER — PHARMACY VISIT (OUTPATIENT)
Dept: PHARMACY | Facility: CLINIC | Age: 64
End: 2024-03-14
Payer: COMMERCIAL

## 2024-03-19 PROCEDURE — RXMED WILLOW AMBULATORY MEDICATION CHARGE

## 2024-03-21 ENCOUNTER — PHARMACY VISIT (OUTPATIENT)
Dept: PHARMACY | Facility: CLINIC | Age: 64
End: 2024-03-21
Payer: COMMERCIAL

## 2024-03-22 PROCEDURE — RXMED WILLOW AMBULATORY MEDICATION CHARGE

## 2024-03-25 ENCOUNTER — PHARMACY VISIT (OUTPATIENT)
Dept: PHARMACY | Facility: CLINIC | Age: 64
End: 2024-03-25
Payer: COMMERCIAL

## 2024-03-27 ENCOUNTER — LAB (OUTPATIENT)
Dept: LAB | Facility: LAB | Age: 64
End: 2024-03-27
Payer: COMMERCIAL

## 2024-03-27 ENCOUNTER — OFFICE VISIT (OUTPATIENT)
Dept: RHEUMATOLOGY | Facility: CLINIC | Age: 64
End: 2024-03-27
Payer: COMMERCIAL

## 2024-03-27 VITALS — SYSTOLIC BLOOD PRESSURE: 119 MMHG | BODY MASS INDEX: 22.31 KG/M2 | WEIGHT: 130 LBS | DIASTOLIC BLOOD PRESSURE: 71 MMHG

## 2024-03-27 DIAGNOSIS — L40.50 PSORIATIC ARTHRITIS (MULTI): ICD-10-CM

## 2024-03-27 DIAGNOSIS — L40.9 PSORIASIS: ICD-10-CM

## 2024-03-27 DIAGNOSIS — L40.50 PSORIATIC ARTHRITIS (MULTI): Primary | ICD-10-CM

## 2024-03-27 LAB
CCP IGG SERPL-ACNC: <1 U/ML
CRP SERPL-MCNC: <0.1 MG/DL
ERYTHROCYTE [SEDIMENTATION RATE] IN BLOOD BY WESTERGREN METHOD: 5 MM/H (ref 0–30)
HBV CORE IGM SER QL: NONREACTIVE
HBV SURFACE AG SERPL QL IA: NONREACTIVE
HCV AB SER QL: NONREACTIVE
RHEUMATOID FACT SER NEPH-ACNC: <10 IU/ML (ref 0–15)
URATE SERPL-MCNC: 6.1 MG/DL (ref 2.3–6.7)

## 2024-03-27 PROCEDURE — 86705 HEP B CORE ANTIBODY IGM: CPT

## 2024-03-27 PROCEDURE — 1036F TOBACCO NON-USER: CPT | Performed by: INTERNAL MEDICINE

## 2024-03-27 PROCEDURE — 87340 HEPATITIS B SURFACE AG IA: CPT

## 2024-03-27 PROCEDURE — 84550 ASSAY OF BLOOD/URIC ACID: CPT

## 2024-03-27 PROCEDURE — 86431 RHEUMATOID FACTOR QUANT: CPT

## 2024-03-27 PROCEDURE — 86803 HEPATITIS C AB TEST: CPT

## 2024-03-27 PROCEDURE — 86200 CCP ANTIBODY: CPT

## 2024-03-27 PROCEDURE — 3008F BODY MASS INDEX DOCD: CPT | Performed by: INTERNAL MEDICINE

## 2024-03-27 PROCEDURE — 86481 TB AG RESPONSE T-CELL SUSP: CPT

## 2024-03-27 PROCEDURE — 86140 C-REACTIVE PROTEIN: CPT

## 2024-03-27 PROCEDURE — 99204 OFFICE O/P NEW MOD 45 MIN: CPT | Performed by: INTERNAL MEDICINE

## 2024-03-27 PROCEDURE — 36415 COLL VENOUS BLD VENIPUNCTURE: CPT

## 2024-03-27 PROCEDURE — 85652 RBC SED RATE AUTOMATED: CPT
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
## 2024-03-28 DIAGNOSIS — G90.50 RSD (REFLEX SYMPATHETIC DYSTROPHY): ICD-10-CM

## 2024-03-28 PROCEDURE — RXMED WILLOW AMBULATORY MEDICATION CHARGE

## 2024-03-28 RX ORDER — GABAPENTIN 300 MG/1
CAPSULE ORAL
Qty: 298 CAPSULE | Refills: 0 | Status: SHIPPED | OUTPATIENT
Start: 2024-03-28 | End: 2024-05-16 | Stop reason: ALTCHOICE

## 2024-03-30 LAB
NIL(NEG) CONTROL SPOT COUNT: NORMAL
PANEL A SPOT COUNT: 1
PANEL B SPOT COUNT: 0
POS CONTROL SPOT COUNT: NORMAL
T-SPOT. TB INTERPRETATION: NEGATIVE

## 2024-04-01 ENCOUNTER — PHARMACY VISIT (OUTPATIENT)
Dept: PHARMACY | Facility: CLINIC | Age: 64
End: 2024-04-01
Payer: COMMERCIAL

## 2024-04-04 ENCOUNTER — TELEPHONE (OUTPATIENT)
Dept: RHEUMATOLOGY | Facility: CLINIC | Age: 64
End: 2024-04-04
Payer: COMMERCIAL

## 2024-04-04 DIAGNOSIS — L20.84 INTRINSIC ECZEMA: ICD-10-CM

## 2024-04-04 PROCEDURE — RXMED WILLOW AMBULATORY MEDICATION CHARGE

## 2024-04-04 RX ORDER — CLOBETASOL PROPIONATE 0.5 MG/G
OINTMENT TOPICAL 2 TIMES DAILY
Qty: 60 G | Refills: 2 | Status: SHIPPED | OUTPATIENT
Start: 2024-04-04 | End: 2024-05-16 | Stop reason: SDUPTHER

## 2024-04-04 NOTE — TELEPHONE ENCOUNTER
Patient stated she spoke with you this morning about medication for her psoriasis /eczema   She wanted to know if the medication Dr. Laguerre was going to put her on has been approved  If not what she can take to help with the itching, she feels awful and needs something to help?    [phone]

## 2024-04-04 NOTE — TELEPHONE ENCOUNTER
I have already advised this patient when I spoke to her that I was going to call her back when  has gotten back with me. However he has not gotten back with me on the previous telephone call that was created. Unfortunatley I do not have an answer for her just yet. But if the itching has gotten that severe since our phone call this morning she is advised to go to urgent care or an ER that's closest to her.

## 2024-04-08 ENCOUNTER — PHARMACY VISIT (OUTPATIENT)
Dept: PHARMACY | Facility: CLINIC | Age: 64
End: 2024-04-08
Payer: COMMERCIAL

## 2024-04-08 PROCEDURE — RXMED WILLOW AMBULATORY MEDICATION CHARGE

## 2024-04-09 ENCOUNTER — TELEPHONE (OUTPATIENT)
Dept: RHEUMATOLOGY | Facility: CLINIC | Age: 64
End: 2024-04-09
Payer: COMMERCIAL

## 2024-04-09 NOTE — TELEPHONE ENCOUNTER
Patient left a message with the answering service, she is wafting for a call regarding her medication     [phone]

## 2024-04-09 NOTE — TELEPHONE ENCOUNTER
Patient left a message with the answering service regarding her medication   She is waiting to hear back I believe it is regarding thomas    [phone]

## 2024-04-10 ENCOUNTER — APPOINTMENT (OUTPATIENT)
Dept: PRIMARY CARE | Facility: CLINIC | Age: 64
End: 2024-04-10
Payer: COMMERCIAL

## 2024-04-12 DIAGNOSIS — L40.50 PSORIATIC ARTHRITIS (MULTI): Primary | ICD-10-CM

## 2024-04-12 RX ORDER — ETANERCEPT 50 MG/ML
50 SOLUTION SUBCUTANEOUS ONCE
Qty: 4 ML | Refills: 0 | Status: SHIPPED | OUTPATIENT
Start: 2024-04-12 | End: 2024-05-08 | Stop reason: SDUPTHER

## 2024-04-12 RX ORDER — ETANERCEPT 50 MG/ML
50 SOLUTION SUBCUTANEOUS ONCE
COMMUNITY
End: 2024-04-12 | Stop reason: SDUPTHER

## 2024-04-15 ENCOUNTER — SPECIALTY PHARMACY (OUTPATIENT)
Dept: PHARMACY | Facility: CLINIC | Age: 64
End: 2024-04-15

## 2024-04-16 ENCOUNTER — PHARMACY VISIT (OUTPATIENT)
Dept: PHARMACY | Facility: CLINIC | Age: 64
End: 2024-04-16
Payer: COMMERCIAL

## 2024-04-18 ENCOUNTER — SPECIALTY PHARMACY (OUTPATIENT)
Dept: PHARMACY | Facility: CLINIC | Age: 64
End: 2024-04-18

## 2024-04-19 ENCOUNTER — SPECIALTY PHARMACY (OUTPATIENT)
Dept: PHARMACY | Facility: CLINIC | Age: 64
End: 2024-04-19

## 2024-04-19 PROCEDURE — RXMED WILLOW AMBULATORY MEDICATION CHARGE

## 2024-04-22 ENCOUNTER — SPECIALTY PHARMACY (OUTPATIENT)
Dept: PHARMACY | Facility: CLINIC | Age: 64
End: 2024-04-22

## 2024-04-22 ENCOUNTER — PHARMACY VISIT (OUTPATIENT)
Dept: PHARMACY | Facility: CLINIC | Age: 64
End: 2024-04-22
Payer: COMMERCIAL

## 2024-04-22 ENCOUNTER — TELEPHONE (OUTPATIENT)
Dept: PRIMARY CARE | Facility: CLINIC | Age: 64
End: 2024-04-22
Payer: COMMERCIAL

## 2024-04-22 PROCEDURE — RXMED WILLOW AMBULATORY MEDICATION CHARGE

## 2024-04-24 ENCOUNTER — PHARMACY VISIT (OUTPATIENT)
Dept: PHARMACY | Facility: CLINIC | Age: 64
End: 2024-04-24
Payer: COMMERCIAL

## 2024-04-25 ENCOUNTER — APPOINTMENT (OUTPATIENT)
Dept: PRIMARY CARE | Facility: CLINIC | Age: 64
End: 2024-04-25
Payer: COMMERCIAL

## 2024-04-26 ENCOUNTER — TELEPHONE (OUTPATIENT)
Dept: PRIMARY CARE | Facility: CLINIC | Age: 64
End: 2024-04-26
Payer: COMMERCIAL

## 2024-04-26 DIAGNOSIS — J30.9 ALLERGIC CONJUNCTIVITIS OF BOTH EYES AND RHINITIS: Primary | ICD-10-CM

## 2024-04-26 DIAGNOSIS — H10.13 ALLERGIC CONJUNCTIVITIS OF BOTH EYES AND RHINITIS: Primary | ICD-10-CM

## 2024-04-26 RX ORDER — CETIRIZINE HYDROCHLORIDE 10 MG/1
10 TABLET ORAL DAILY
Qty: 90 TABLET | Refills: 1 | Status: SHIPPED | OUTPATIENT
Start: 2024-04-26 | End: 2024-05-16 | Stop reason: SDUPTHER

## 2024-04-26 NOTE — TELEPHONE ENCOUNTER
Patient was late yesterday and was rescheduled and is asking for either Zyrtec or Claritin. She is using [facility] pharmacy

## 2024-05-08 DIAGNOSIS — L40.50 PSORIATIC ARTHRITIS (MULTI): ICD-10-CM

## 2024-05-08 RX ORDER — ETANERCEPT 50 MG/ML
50 SOLUTION SUBCUTANEOUS
Qty: 4 ML | Refills: 0 | Status: SHIPPED | OUTPATIENT
Start: 2024-05-12 | End: 2024-05-09 | Stop reason: SDUPTHER

## 2024-05-09 ENCOUNTER — TELEPHONE (OUTPATIENT)
Dept: PRIMARY CARE | Facility: CLINIC | Age: 64
End: 2024-05-09
Payer: COMMERCIAL

## 2024-05-09 ENCOUNTER — SPECIALTY PHARMACY (OUTPATIENT)
Dept: PHARMACY | Facility: CLINIC | Age: 64
End: 2024-05-09

## 2024-05-09 DIAGNOSIS — L40.50 PSORIATIC ARTHRITIS (MULTI): ICD-10-CM

## 2024-05-09 RX ORDER — ETANERCEPT 50 MG/ML
50 SOLUTION SUBCUTANEOUS
Qty: 4 ML | Refills: 0 | Status: SHIPPED | OUTPATIENT
Start: 2024-05-12 | End: 2024-06-04 | Stop reason: SDUPTHER

## 2024-05-10 ENCOUNTER — SPECIALTY PHARMACY (OUTPATIENT)
Dept: PHARMACY | Facility: CLINIC | Age: 64
End: 2024-05-10

## 2024-05-10 ENCOUNTER — PHARMACY VISIT (OUTPATIENT)
Dept: PHARMACY | Facility: CLINIC | Age: 64
End: 2024-05-10
Payer: COMMERCIAL

## 2024-05-10 PROCEDURE — RXMED WILLOW AMBULATORY MEDICATION CHARGE

## 2024-05-15 ENCOUNTER — SPECIALTY PHARMACY (OUTPATIENT)
Dept: RHEUMATOLOGY | Facility: CLINIC | Age: 64
End: 2024-05-15
Payer: COMMERCIAL

## 2024-05-15 NOTE — PROGRESS NOTES
Spoke with patient on 5/15/24 for FFA. Patient declined FFA and counseling. She stated that she was very familiar with the medication and has been on Enbrel in the past.

## 2024-05-16 ENCOUNTER — OFFICE VISIT (OUTPATIENT)
Dept: PRIMARY CARE | Facility: CLINIC | Age: 64
End: 2024-05-16
Payer: COMMERCIAL

## 2024-05-16 ENCOUNTER — PHARMACY VISIT (OUTPATIENT)
Dept: PHARMACY | Facility: CLINIC | Age: 64
End: 2024-05-16
Payer: COMMERCIAL

## 2024-05-16 VITALS — SYSTOLIC BLOOD PRESSURE: 106 MMHG | DIASTOLIC BLOOD PRESSURE: 71 MMHG | TEMPERATURE: 98.2 F | HEART RATE: 109 BPM

## 2024-05-16 DIAGNOSIS — G90.50 RSD (REFLEX SYMPATHETIC DYSTROPHY): ICD-10-CM

## 2024-05-16 DIAGNOSIS — M10.9 ACUTE GOUT, UNSPECIFIED CAUSE, UNSPECIFIED SITE: ICD-10-CM

## 2024-05-16 DIAGNOSIS — L20.84 INTRINSIC ECZEMA: ICD-10-CM

## 2024-05-16 DIAGNOSIS — H10.13 ALLERGIC CONJUNCTIVITIS OF BOTH EYES AND RHINITIS: ICD-10-CM

## 2024-05-16 DIAGNOSIS — Z12.11 ENCOUNTER FOR SCREENING FOR MALIGNANT NEOPLASM OF COLON: ICD-10-CM

## 2024-05-16 DIAGNOSIS — G90.523 COMPLEX REGIONAL PAIN SYNDROME TYPE 1 OF BOTH LOWER EXTREMITIES: Primary | ICD-10-CM

## 2024-05-16 DIAGNOSIS — J30.9 ALLERGIC CONJUNCTIVITIS OF BOTH EYES AND RHINITIS: ICD-10-CM

## 2024-05-16 PROCEDURE — 1036F TOBACCO NON-USER: CPT | Performed by: INTERNAL MEDICINE

## 2024-05-16 PROCEDURE — 99214 OFFICE O/P EST MOD 30 MIN: CPT | Performed by: INTERNAL MEDICINE

## 2024-05-16 PROCEDURE — RXMED WILLOW AMBULATORY MEDICATION CHARGE

## 2024-05-16 RX ORDER — GABAPENTIN 600 MG/1
TABLET ORAL
Qty: 298 TABLET | Refills: 0 | Status: SHIPPED | OUTPATIENT
Start: 2024-05-16 | End: 2024-08-28

## 2024-05-16 RX ORDER — CLOBETASOL PROPIONATE 0.5 MG/G
OINTMENT TOPICAL 2 TIMES DAILY
Qty: 60 G | Refills: 2 | Status: SHIPPED | OUTPATIENT
Start: 2024-05-16

## 2024-05-16 RX ORDER — CETIRIZINE HYDROCHLORIDE 10 MG/1
10 TABLET ORAL DAILY
Qty: 90 TABLET | Refills: 1 | Status: SHIPPED | OUTPATIENT
Start: 2024-05-16 | End: 2024-11-12

## 2024-05-16 RX ORDER — CLOBETASOL PROPIONATE 0.46 MG/ML
SOLUTION TOPICAL 2 TIMES DAILY
Qty: 60 ML | Refills: 2 | Status: SHIPPED | OUTPATIENT
Start: 2024-05-16

## 2024-05-16 ASSESSMENT — ENCOUNTER SYMPTOMS
SHORTNESS OF BREATH: 0
POLYDIPSIA: 0
CHILLS: 0
COUGH: 0
FEVER: 0
PALPITATIONS: 0

## 2024-05-16 ASSESSMENT — PAIN SCALES - GENERAL: PAINLEVEL: 9

## 2024-05-16 NOTE — PROGRESS NOTES
Subjective   Patient ID: Corinne L Tanks is a 64 y.o. female who presents for Follow-up.    64-year-old female presents today for routine follow-up.  She has been experiencing significant allergies this spring and we adjusted medications to better control them Zyrtec In replacement of Benadryl which she was originally trying to use.    She has complex regional sympathetic dystrophy and chronic pain we are adjusting her gabapentin further titration for the management of this condition and the chronic pain associated.  She has been tolerant of this medication with no perceived side effects.  Pain levels are only modestly been improved low-dose of gabapentin use to date.    She has experiences and believes that the midodrine she was taking has been causing her significant side effects and would like to discontinue that medication due to the side effects.  We will monitor without it, see how she does and if there is any lightheadedness dizziness or blood pressure related concerns we may retry a lower dose of the midodrine.  Side effects are happening every time she is given the midodrine routinely by the nurses at her facility and they include GI upset cramping and headaches.         Review of Systems   Constitutional:  Negative for chills and fever.   Respiratory:  Negative for cough and shortness of breath.    Cardiovascular:  Negative for chest pain and palpitations.   Endocrine: Negative for polydipsia and polyuria.       Objective   /71 (BP Location: Left arm, Patient Position: Sitting, BP Cuff Size: Adult)   Pulse 109   Temp 36.8 °C (98.2 °F) (Oral)     Physical Exam  Constitutional:       Appearance: Normal appearance.   HENT:      Head: Normocephalic and atraumatic.   Eyes:      Extraocular Movements: Extraocular movements intact.      Pupils: Pupils are equal, round, and reactive to light.   Neck:      Thyroid: No thyroid mass or thyromegaly.   Cardiovascular:      Rate and Rhythm: Normal rate and  regular rhythm.      Heart sounds: No murmur heard.     No friction rub. No gallop.   Pulmonary:      Effort: No respiratory distress.      Breath sounds: No wheezing, rhonchi or rales.   Musculoskeletal:      Right lower leg: No edema.      Left lower leg: No edema.   Neurological:      Mental Status: She is alert.         Assessment/Plan   Problem List Items Addressed This Visit             ICD-10-CM    Complex regional pain syndrome of lower extremity - Primary G90.529    Relevant Medications    gabapentin (Neurontin) 600 mg tablet    RSD (reflex sympathetic dystrophy) G90.50    Relevant Medications    gabapentin (Neurontin) 600 mg tablet     Other Visit Diagnoses         Codes    Intrinsic eczema     L20.84    Relevant Medications    clobetasol (Temovate) 0.05 % ointment    clobetasol (Temovate) 0.05 % external solution    Encounter for screening for malignant neoplasm of colon     Z12.11    Relevant Orders    Colonoscopy Screening; Average Risk Patient    Allergic conjunctivitis of both eyes and rhinitis     H10.13, J30.9    Relevant Medications    cetirizine (ZyrTEC) 10 mg tablet    Acute gout, unspecified cause, unspecified site     M10.9

## 2024-05-29 ENCOUNTER — APPOINTMENT (OUTPATIENT)
Dept: RHEUMATOLOGY | Facility: CLINIC | Age: 64
End: 2024-05-29
Payer: COMMERCIAL

## 2024-05-30 PROCEDURE — RXMED WILLOW AMBULATORY MEDICATION CHARGE

## 2024-05-31 ENCOUNTER — PHARMACY VISIT (OUTPATIENT)
Dept: PHARMACY | Facility: CLINIC | Age: 64
End: 2024-05-31
Payer: COMMERCIAL

## 2024-06-03 ENCOUNTER — SPECIALTY PHARMACY (OUTPATIENT)
Dept: PHARMACY | Facility: CLINIC | Age: 64
End: 2024-06-03

## 2024-06-04 ENCOUNTER — TELEPHONE (OUTPATIENT)
Dept: RHEUMATOLOGY | Facility: CLINIC | Age: 64
End: 2024-06-04

## 2024-06-04 DIAGNOSIS — L40.50 PSORIATIC ARTHRITIS (MULTI): ICD-10-CM

## 2024-06-04 RX ORDER — ETANERCEPT 50 MG/ML
50 SOLUTION SUBCUTANEOUS
Qty: 4 ML | Refills: 0 | Status: SHIPPED | OUTPATIENT
Start: 2024-06-04 | End: 2024-06-05 | Stop reason: SDUPTHER

## 2024-06-04 RX ORDER — ETANERCEPT 50 MG/ML
50 SOLUTION SUBCUTANEOUS
Qty: 4 ML | Refills: 0 | Status: CANCELLED | OUTPATIENT
Start: 2024-06-04

## 2024-06-04 NOTE — TELEPHONE ENCOUNTER
Enbrel prescription was sent to the wrong pharmacy    Could you please resend to  specialty pharmacy    Enbrel 50mg/ml 1ml injection -inject 1 ml weekly under the skin one time per week     Specialty pharmacy     Patient is out of her medication ty

## 2024-06-05 ENCOUNTER — SPECIALTY PHARMACY (OUTPATIENT)
Dept: PHARMACY | Facility: CLINIC | Age: 64
End: 2024-06-05

## 2024-06-05 DIAGNOSIS — L40.50 PSORIATIC ARTHRITIS (MULTI): ICD-10-CM

## 2024-06-05 PROCEDURE — RXMED WILLOW AMBULATORY MEDICATION CHARGE

## 2024-06-05 RX ORDER — ETANERCEPT 50 MG/ML
50 SOLUTION SUBCUTANEOUS
Qty: 4 ML | Refills: 0 | Status: SHIPPED | OUTPATIENT
Start: 2024-06-05 | End: 2024-06-14 | Stop reason: SDUPTHER

## 2024-06-06 ENCOUNTER — PHARMACY VISIT (OUTPATIENT)
Dept: PHARMACY | Facility: CLINIC | Age: 64
End: 2024-06-06
Payer: COMMERCIAL

## 2024-06-07 ENCOUNTER — SPECIALTY PHARMACY (OUTPATIENT)
Dept: PHARMACY | Facility: CLINIC | Age: 64
End: 2024-06-07

## 2024-06-14 ENCOUNTER — LAB (OUTPATIENT)
Dept: LAB | Facility: LAB | Age: 64
End: 2024-06-14
Payer: COMMERCIAL

## 2024-06-14 ENCOUNTER — APPOINTMENT (OUTPATIENT)
Dept: RHEUMATOLOGY | Facility: CLINIC | Age: 64
End: 2024-06-14
Payer: COMMERCIAL

## 2024-06-14 VITALS
HEART RATE: 90 BPM | DIASTOLIC BLOOD PRESSURE: 77 MMHG | SYSTOLIC BLOOD PRESSURE: 114 MMHG | WEIGHT: 171 LBS | BODY MASS INDEX: 29.19 KG/M2 | HEIGHT: 64 IN | OXYGEN SATURATION: 99 %

## 2024-06-14 DIAGNOSIS — L40.50 PSORIATIC ARTHRITIS (MULTI): ICD-10-CM

## 2024-06-14 DIAGNOSIS — L40.50 PSORIATIC ARTHRITIS (MULTI): Primary | ICD-10-CM

## 2024-06-14 LAB
ALBUMIN SERPL BCP-MCNC: 4.5 G/DL (ref 3.4–5)
ALP SERPL-CCNC: 66 U/L (ref 33–136)
ALT SERPL W P-5'-P-CCNC: 9 U/L (ref 7–45)
AST SERPL W P-5'-P-CCNC: 18 U/L (ref 9–39)
BILIRUB DIRECT SERPL-MCNC: 0.1 MG/DL (ref 0–0.3)
BILIRUB SERPL-MCNC: 0.4 MG/DL (ref 0–1.2)
ERYTHROCYTE [DISTWIDTH] IN BLOOD BY AUTOMATED COUNT: 12.1 % (ref 11.5–14.5)
ERYTHROCYTE [SEDIMENTATION RATE] IN BLOOD BY WESTERGREN METHOD: 14 MM/H (ref 0–30)
HCT VFR BLD AUTO: 41.5 % (ref 36–46)
HGB BLD-MCNC: 13.6 G/DL (ref 12–16)
MCH RBC QN AUTO: 33.3 PG (ref 26–34)
MCHC RBC AUTO-ENTMCNC: 32.8 G/DL (ref 32–36)
MCV RBC AUTO: 102 FL (ref 80–100)
NRBC BLD-RTO: 0 /100 WBCS (ref 0–0)
PLATELET # BLD AUTO: 238 X10*3/UL (ref 150–450)
PROT SERPL-MCNC: 7.4 G/DL (ref 6.4–8.2)
RBC # BLD AUTO: 4.08 X10*6/UL (ref 4–5.2)
WBC # BLD AUTO: 6.7 X10*3/UL (ref 4.4–11.3)

## 2024-06-14 PROCEDURE — 99213 OFFICE O/P EST LOW 20 MIN: CPT | Performed by: INTERNAL MEDICINE

## 2024-06-14 PROCEDURE — 85652 RBC SED RATE AUTOMATED: CPT

## 2024-06-14 PROCEDURE — 85027 COMPLETE CBC AUTOMATED: CPT

## 2024-06-14 PROCEDURE — 80076 HEPATIC FUNCTION PANEL: CPT

## 2024-06-14 PROCEDURE — 36415 COLL VENOUS BLD VENIPUNCTURE: CPT

## 2024-06-14 PROCEDURE — 1036F TOBACCO NON-USER: CPT | Performed by: INTERNAL MEDICINE

## 2024-06-14 RX ORDER — ETANERCEPT 50 MG/ML
50 SOLUTION SUBCUTANEOUS
Qty: 4 ML | Refills: 2 | Status: SHIPPED | OUTPATIENT
Start: 2024-06-16 | End: 2024-09-14

## 2024-06-14 ASSESSMENT — COLUMBIA-SUICIDE SEVERITY RATING SCALE - C-SSRS
1. IN THE PAST MONTH, HAVE YOU WISHED YOU WERE DEAD OR WISHED YOU COULD GO TO SLEEP AND NOT WAKE UP?: NO
2. HAVE YOU ACTUALLY HAD ANY THOUGHTS OF KILLING YOURSELF?: NO
6. HAVE YOU EVER DONE ANYTHING, STARTED TO DO ANYTHING, OR PREPARED TO DO ANYTHING TO END YOUR LIFE?: NO

## 2024-06-14 ASSESSMENT — PATIENT HEALTH QUESTIONNAIRE - PHQ9
SUM OF ALL RESPONSES TO PHQ9 QUESTIONS 1 AND 2: 0
1. LITTLE INTEREST OR PLEASURE IN DOING THINGS: NOT AT ALL
2. FEELING DOWN, DEPRESSED OR HOPELESS: NOT AT ALL

## 2024-06-14 NOTE — PROGRESS NOTES
"Subjective  .Corinne L Tanks is a 64 y.o. female who presents for Follow-up (Pt is here for F/U).    HPI. 64-year-old female with history of psoriatic arthritis, RA, psoriasis, FMS, lumbar spondylosis with radiculopathy, hypotension, depression, bipolar disorder, seizure disorder, RSD, gout and hypothyroidism presented for follow-up.    She has no joint pain but off-and-on cramping in the hands and shoulders.  Psoriatic rashes are almost cleared.  She has back on Enbrel since March.  Labs showed negative RF, anti-CCP with normal inflammatory markers.    Immunosuppression: Enbrel. (3/2024).      Review of Systems   All other systems reviewed and are negative.    Objective     Blood pressure 114/77, pulse 90, height 1.626 m (5' 4\"), weight 77.6 kg (171 lb), SpO2 99%.    Physical Exam.  Gen. AAO x3, NAD.  HEENT: No pallor or icterus, PERRLA, EOMI. Parotid glands  not enlarged. No cervical lymphadenopathy .  Skin: No psoriatic rashes.  Heart: S1, S2/ RRR.   Lungs: CTA B.  Abdomen: Soft, NT/ND.  MSK: No.swelling or tenderness of the  upper or lower extremity joints with full ROM, Neck,spine and Alvino SI with out tenderness.  Neuro: Sensation to touch intact.Strength 5/5 throughout.   Psych:Appropriate mood and behavior  EXT: No edema    Assessment/Plan . 64-year-old female with history of psoriatic arthritis, RA, psoriasis, FMS, lumbar spondylosis with radiculopathy, hypotension, depression, bipolar disorder, seizure disorder, RSD, gout and hypothyroidism presented for follow-up.    #1: Psoriatic arthritis.  She is doing well.  -Continue Enbrel injection every week.  -Labs.    Follow-up in 3 months.     This note was partially generated using the Dragon Voice recognition system. There may be some incorrect wording, spelling and/or spelling errors or punctuation errors that were not corrected prior to committing the note to the medical record.      Problem List Items Addressed This Visit    None  Visit Diagnoses       " Psoriatic arthritis (Multi)    -  Primary    Relevant Medications    EnbreL 50 mg/mL (1 mL) injection (Start on 6/16/2024)    Other Relevant Orders    CBC    Sedimentation Rate    Hepatic Function Panel                 Active Ambulatory Problems     Diagnosis Date Noted    Anemia, pernicious 04/22/2023    Bipolar disorder, current episode manic without psychotic features (Multi) 04/22/2023    Carpal tunnel syndrome 04/22/2023    Complex regional pain syndrome of lower extremity 04/22/2023    Headache 04/22/2023    RSD (reflex sympathetic dystrophy) 04/22/2023    Falls frequently 04/22/2023    Gait disturbance 04/22/2023    Hypotension 04/22/2023    Hypothyroidism 04/22/2023    Lumbago 04/22/2023    Lumbar radiculopathy 04/22/2023    Lumbar spondylosis 04/22/2023    Malaise and fatigue 04/22/2023    Psoriasis 04/22/2023    Renal dysfunction 04/22/2023    Scoliosis 04/22/2023    Seizure disorder (Multi) 04/22/2023    Spondylolisthesis, acquired 04/22/2023     Resolved Ambulatory Problems     Diagnosis Date Noted    No Resolved Ambulatory Problems     Past Medical History:   Diagnosis Date    Personal history of other mental and behavioral disorders     Personal history of other specified conditions 08/08/2019    Personal history of pneumonia (recurrent)     Radiculopathy, lumbar region 12/05/2022       No family history on file.    Past Surgical History:   Procedure Laterality Date    OTHER SURGICAL HISTORY  06/19/2020    Pulmonary decortication    OTHER SURGICAL HISTORY  06/19/2020    Lung wedge resection    OTHER SURGICAL HISTORY  06/19/2020    Thoracentesis       Social History     Tobacco Use   Smoking Status Never    Passive exposure: Never   Smokeless Tobacco Never       Allergies  Tramadol and Carbamazepine    Current Meds  Current Outpatient Medications   Medication Instructions    cetirizine (ZYRTEC) 10 mg, oral, Daily    cholecalciferol (Vitamin D-3) 50,000 unit capsule Take 1 capsule by mouth twice weekly  on Wednesday and Saturday    clobetasol (Temovate) 0.05 % external solution Apply topically to affected area(s) 2 times a day.    clobetasol (Temovate) 0.05 % ointment Apply topically 2 times a day. Not for use on face, arm pits, or groin    colchicine 0.6 mg tablet TAKE ONE TABLET BY MOUTH ONCE DAILY FOR GOUT    diazePAM (Valium) 10 mg tablet 1 tablet, oral, 2 times daily    divalproex sprinkle (DEPAKOTE SPRINKLE) 250 mg, oral, Every 8 hours    [START ON 6/16/2024] EnbreL 50 mg, subcutaneous, Once Weekly    fluticasone (Flonase) 50 mcg/actuation nasal spray 1 spray, Each Nostril, Daily, Shake gently. Before first use, prime pump. After use, clean tip and replace cap.    gabapentin (Neurontin) 600 mg tablet Take 1 tablet (600 mg) by mouth 2 times a day for 14 days, THEN 1 tablet (600 mg) 3 times a day.    levothyroxine (SYNTHROID, LEVOXYL) 25 mcg, oral, Daily    lurasidone (LATUDA) 20 mg, oral, Daily with breakfast    magnesium hydroxide (Milk of Magnesia) 400 mg/5 mL suspension Take 30 milliliters by mouth every 8 hours as needed for constipation    melatonin (Melatin) 3 mg tablet Take 2 tablets by mouth at bedtime as needed for sleep    QUEtiapine (SEROquel) 25 mg tablet Take 1 tablet (25 mg) by mouth once daily at bedtime for antipsychotic    tiZANidine (Zanaflex) 4 mg tablet oral        Lab Results   Component Value Date    RF <10 03/27/2024    SEDRATE 5 03/27/2024    CRP <0.10 03/27/2024    URICACID 6.1 03/27/2024    CKTOTAL 1,015 (H) 04/02/2023             Malcom Laguerre MD

## 2024-06-26 PROCEDURE — RXMED WILLOW AMBULATORY MEDICATION CHARGE

## 2024-06-28 ENCOUNTER — SPECIALTY PHARMACY (OUTPATIENT)
Dept: PHARMACY | Facility: CLINIC | Age: 64
End: 2024-06-28

## 2024-06-28 ENCOUNTER — PHARMACY VISIT (OUTPATIENT)
Dept: PHARMACY | Facility: CLINIC | Age: 64
End: 2024-06-28
Payer: COMMERCIAL

## 2024-07-01 ENCOUNTER — TELEPHONE (OUTPATIENT)
Dept: PRIMARY CARE | Facility: CLINIC | Age: 64
End: 2024-07-01
Payer: COMMERCIAL

## 2024-07-01 NOTE — TELEPHONE ENCOUNTER
Topical lidocaine cream would be the only reasonable option to try can be applied every 2 hours as needed

## 2024-07-01 NOTE — TELEPHONE ENCOUNTER
Patient is asking for an OTC cream or ointment to help with her irreversible nerve damage in her feet? With this hot weather her feet have  been swelling, burning and stinging. The gabapentin works for a short period of time and Biofreeze doesn't seem to work.

## 2024-07-02 ENCOUNTER — PHARMACY VISIT (OUTPATIENT)
Dept: PHARMACY | Facility: CLINIC | Age: 64
End: 2024-07-02
Payer: COMMERCIAL

## 2024-07-03 DIAGNOSIS — G90.523 COMPLEX REGIONAL PAIN SYNDROME TYPE 1 OF BOTH LOWER EXTREMITIES: Primary | ICD-10-CM

## 2024-07-03 RX ORDER — SENNOSIDES 25 MG/1
1 TABLET, FILM COATED ORAL EVERY 6 HOURS PRN
Qty: 45 G | Refills: 3 | Status: SHIPPED | OUTPATIENT
Start: 2024-07-03

## 2024-07-05 PROCEDURE — RXMED WILLOW AMBULATORY MEDICATION CHARGE

## 2024-07-06 ENCOUNTER — PHARMACY VISIT (OUTPATIENT)
Dept: PHARMACY | Facility: CLINIC | Age: 64
End: 2024-07-06
Payer: COMMERCIAL

## 2024-07-08 PROCEDURE — RXMED WILLOW AMBULATORY MEDICATION CHARGE

## 2024-07-09 ENCOUNTER — PHARMACY VISIT (OUTPATIENT)
Dept: PHARMACY | Facility: CLINIC | Age: 64
End: 2024-07-09
Payer: COMMERCIAL

## 2024-07-12 ENCOUNTER — TELEPHONE (OUTPATIENT)
Dept: PRIMARY CARE | Facility: CLINIC | Age: 64
End: 2024-07-12

## 2024-07-12 NOTE — TELEPHONE ENCOUNTER
Patient called in stating she no longer uses the [facility] would like a new prescription of the lidocaine cream sent to the [facility]

## 2024-07-13 DIAGNOSIS — G90.523 COMPLEX REGIONAL PAIN SYNDROME TYPE 1 OF BOTH LOWER EXTREMITIES: ICD-10-CM

## 2024-07-13 RX ORDER — SENNOSIDES 25 MG/1
1 TABLET, FILM COATED ORAL EVERY 6 HOURS PRN
Qty: 45 G | Refills: 3 | Status: SHIPPED | OUTPATIENT
Start: 2024-07-13

## 2024-07-15 ENCOUNTER — PHARMACY VISIT (OUTPATIENT)
Dept: PHARMACY | Facility: CLINIC | Age: 64
End: 2024-07-15
Payer: COMMERCIAL

## 2024-07-15 PROCEDURE — RXMED WILLOW AMBULATORY MEDICATION CHARGE

## 2024-07-24 ENCOUNTER — SPECIALTY PHARMACY (OUTPATIENT)
Dept: PHARMACY | Facility: CLINIC | Age: 64
End: 2024-07-24

## 2024-07-26 PROCEDURE — RXMED WILLOW AMBULATORY MEDICATION CHARGE

## 2024-07-31 ENCOUNTER — PHARMACY VISIT (OUTPATIENT)
Dept: PHARMACY | Facility: CLINIC | Age: 64
End: 2024-07-31
Payer: COMMERCIAL

## 2024-08-01 ENCOUNTER — SPECIALTY PHARMACY (OUTPATIENT)
Dept: PHARMACY | Facility: CLINIC | Age: 64
End: 2024-08-01

## 2024-08-07 DIAGNOSIS — G90.523 COMPLEX REGIONAL PAIN SYNDROME TYPE 1 OF BOTH LOWER EXTREMITIES: ICD-10-CM

## 2024-08-07 DIAGNOSIS — G90.50 RSD (REFLEX SYMPATHETIC DYSTROPHY): ICD-10-CM

## 2024-08-07 RX ORDER — GABAPENTIN 600 MG/1
TABLET ORAL
Qty: 298 TABLET | Refills: 0 | Status: SHIPPED | OUTPATIENT
Start: 2024-08-07 | End: 2024-11-18

## 2024-08-25 PROCEDURE — RXMED WILLOW AMBULATORY MEDICATION CHARGE

## 2024-08-27 PROCEDURE — RXMED WILLOW AMBULATORY MEDICATION CHARGE

## 2024-08-28 ENCOUNTER — PHARMACY VISIT (OUTPATIENT)
Dept: PHARMACY | Facility: CLINIC | Age: 64
End: 2024-08-28
Payer: COMMERCIAL

## 2024-08-29 ENCOUNTER — PHARMACY VISIT (OUTPATIENT)
Dept: PHARMACY | Facility: CLINIC | Age: 64
End: 2024-08-29
Payer: COMMERCIAL

## 2024-09-13 ENCOUNTER — APPOINTMENT (OUTPATIENT)
Dept: RHEUMATOLOGY | Facility: CLINIC | Age: 64
End: 2024-09-13
Payer: COMMERCIAL

## 2024-09-13 DIAGNOSIS — L40.50 PSORIATIC ARTHRITIS (MULTI): ICD-10-CM

## 2024-09-13 RX ORDER — ETANERCEPT 50 MG/ML
50 SOLUTION SUBCUTANEOUS
Qty: 4 ML | Refills: 2 | Status: SHIPPED | OUTPATIENT
Start: 2024-09-15 | End: 2024-12-14

## 2024-09-30 ENCOUNTER — SPECIALTY PHARMACY (OUTPATIENT)
Dept: PHARMACY | Facility: CLINIC | Age: 64
End: 2024-09-30

## 2024-09-30 PROCEDURE — RXMED WILLOW AMBULATORY MEDICATION CHARGE

## 2024-10-01 ENCOUNTER — PHARMACY VISIT (OUTPATIENT)
Dept: PHARMACY | Facility: CLINIC | Age: 64
End: 2024-10-01
Payer: COMMERCIAL

## 2024-10-03 ENCOUNTER — SPECIALTY PHARMACY (OUTPATIENT)
Dept: PHARMACY | Facility: CLINIC | Age: 64
End: 2024-10-03

## 2024-10-04 ENCOUNTER — LAB (OUTPATIENT)
Dept: LAB | Facility: LAB | Age: 64
End: 2024-10-04
Payer: COMMERCIAL

## 2024-10-04 ENCOUNTER — APPOINTMENT (OUTPATIENT)
Dept: RHEUMATOLOGY | Facility: CLINIC | Age: 64
End: 2024-10-04
Payer: COMMERCIAL

## 2024-10-04 VITALS
BODY MASS INDEX: 32.1 KG/M2 | DIASTOLIC BLOOD PRESSURE: 71 MMHG | HEIGHT: 64 IN | WEIGHT: 188 LBS | HEART RATE: 97 BPM | SYSTOLIC BLOOD PRESSURE: 100 MMHG | OXYGEN SATURATION: 96 %

## 2024-10-04 DIAGNOSIS — L40.50 PSORIATIC ARTHRITIS (MULTI): ICD-10-CM

## 2024-10-04 DIAGNOSIS — L40.50 PSORIATIC ARTHRITIS (MULTI): Primary | ICD-10-CM

## 2024-10-04 LAB
ALBUMIN SERPL BCP-MCNC: 3.9 G/DL (ref 3.4–5)
ALP SERPL-CCNC: 71 U/L (ref 33–136)
ALT SERPL W P-5'-P-CCNC: 5 U/L (ref 7–45)
AST SERPL W P-5'-P-CCNC: 16 U/L (ref 9–39)
BILIRUB DIRECT SERPL-MCNC: 0 MG/DL (ref 0–0.3)
BILIRUB SERPL-MCNC: 0.4 MG/DL (ref 0–1.2)
CRP SERPL-MCNC: 0.27 MG/DL
ERYTHROCYTE [DISTWIDTH] IN BLOOD BY AUTOMATED COUNT: 12.1 % (ref 11.5–14.5)
ERYTHROCYTE [SEDIMENTATION RATE] IN BLOOD BY WESTERGREN METHOD: 5 MM/H (ref 0–30)
HCT VFR BLD AUTO: 39.4 % (ref 36–46)
HGB BLD-MCNC: 12.7 G/DL (ref 12–16)
MCH RBC QN AUTO: 31.9 PG (ref 26–34)
MCHC RBC AUTO-ENTMCNC: 32.2 G/DL (ref 32–36)
MCV RBC AUTO: 99 FL (ref 80–100)
NRBC BLD-RTO: 0 /100 WBCS (ref 0–0)
PLATELET # BLD AUTO: 208 X10*3/UL (ref 150–450)
PROT SERPL-MCNC: 6.9 G/DL (ref 6.4–8.2)
RBC # BLD AUTO: 3.98 X10*6/UL (ref 4–5.2)
WBC # BLD AUTO: 8.1 X10*3/UL (ref 4.4–11.3)

## 2024-10-04 PROCEDURE — 36415 COLL VENOUS BLD VENIPUNCTURE: CPT

## 2024-10-04 PROCEDURE — 86140 C-REACTIVE PROTEIN: CPT

## 2024-10-04 PROCEDURE — 82040 ASSAY OF SERUM ALBUMIN: CPT

## 2024-10-04 PROCEDURE — 84450 TRANSFERASE (AST) (SGOT): CPT

## 2024-10-04 PROCEDURE — 3008F BODY MASS INDEX DOCD: CPT | Performed by: INTERNAL MEDICINE

## 2024-10-04 PROCEDURE — 84460 ALANINE AMINO (ALT) (SGPT): CPT

## 2024-10-04 PROCEDURE — 84075 ASSAY ALKALINE PHOSPHATASE: CPT

## 2024-10-04 PROCEDURE — 82247 BILIRUBIN TOTAL: CPT

## 2024-10-04 PROCEDURE — 1036F TOBACCO NON-USER: CPT | Performed by: INTERNAL MEDICINE

## 2024-10-04 PROCEDURE — 99213 OFFICE O/P EST LOW 20 MIN: CPT | Performed by: INTERNAL MEDICINE

## 2024-10-04 PROCEDURE — 85652 RBC SED RATE AUTOMATED: CPT

## 2024-10-04 PROCEDURE — 85027 COMPLETE CBC AUTOMATED: CPT

## 2024-10-04 PROCEDURE — 84155 ASSAY OF PROTEIN SERUM: CPT

## 2024-10-04 NOTE — PROGRESS NOTES
"Subjective . Corinne L Tanks is a 64 y.o. female who presents for Follow-up (4 month follow ).    HPI. 64-year-old female with history of psoriatic arthritis, RA, psoriasis, FMS, lumbar spondylosis with radiculopathy, hypotension, depression, bipolar disorder, seizure disorder, RSD, gout and hypothyroidism presented for follow-up.     She reports stiffness in the hands for 15 minutes in the morning.  She has not noticed swelling of the knuckles.  She has chronic right foot neuropathic pain.  No more psoriatic rashes.    Immunosuppression: Enbrel. (3/2024).     Review of Systems   All other systems reviewed and are negative.    Objective     Blood pressure 100/71, pulse 97, height 1.626 m (5' 4\"), weight 85.3 kg (188 lb), SpO2 96%.    Physical Exam.  Gen. AAO x3, NAD.  HEENT: No pallor or icterus, PERRLA, EOMI. No cervical lymphadenopathy .  Skin: No rashes.  Heart: S1, S2/ RRR.   Lungs: CTA B.  Abdomen: Soft, NT/ND.  MSK: No swollen or tender joint.  Neuro:  Sensation to touch intact.Strength 5/5 throughout.   Psych:Appropriate mood and behavior  EXT: No edema    Assessment/Plan . 64-year-old female with history of psoriatic arthritis, RA, psoriasis, FMS, lumbar spondylosis with radiculopathy, hypotension, depression, bipolar disorder, seizure disorder, RSD, gout and hypothyroidism presented for follow-up.     #1: Psoriatic arthritis.  Stable.  -Continue Enbrel injection every week.  -Labs.    Follow-up in 3 months.     This note was partially generated using the Dragon Voice recognition system. There may be some incorrect wording, spelling and/or spelling errors or punctuation errors that were not corrected prior to committing the note to the medical record.    Problem List Items Addressed This Visit    None  Visit Diagnoses       Psoriatic arthritis (Multi)    -  Primary    Relevant Orders    C-Reactive Protein    CBC    Hepatic Function Panel    Sedimentation Rate                 Active Ambulatory Problems     " Diagnosis Date Noted    Anemia, pernicious 04/22/2023    Bipolar disorder, current episode manic without psychotic features 04/22/2023    Carpal tunnel syndrome 04/22/2023    Complex regional pain syndrome of lower extremity 04/22/2023    Headache 04/22/2023    RSD (reflex sympathetic dystrophy) 04/22/2023    Falls frequently 04/22/2023    Gait disturbance 04/22/2023    Hypotension 04/22/2023    Hypothyroidism 04/22/2023    Lumbago 04/22/2023    Lumbar radiculopathy 04/22/2023    Lumbar spondylosis 04/22/2023    Malaise and fatigue 04/22/2023    Psoriasis 04/22/2023    Renal dysfunction 04/22/2023    Scoliosis 04/22/2023    Seizure disorder (Multi) 04/22/2023    Spondylolisthesis, acquired 04/22/2023     Resolved Ambulatory Problems     Diagnosis Date Noted    No Resolved Ambulatory Problems     Past Medical History:   Diagnosis Date    Personal history of other mental and behavioral disorders     Personal history of other specified conditions 08/08/2019    Personal history of pneumonia (recurrent)     Radiculopathy, lumbar region 12/05/2022       No family history on file.    Past Surgical History:   Procedure Laterality Date    OTHER SURGICAL HISTORY  06/19/2020    Pulmonary decortication    OTHER SURGICAL HISTORY  06/19/2020    Lung wedge resection    OTHER SURGICAL HISTORY  06/19/2020    Thoracentesis       Social History     Tobacco Use   Smoking Status Never    Passive exposure: Never   Smokeless Tobacco Never       Allergies  Tramadol and Carbamazepine    Current Meds  Current Outpatient Medications   Medication Instructions    cetirizine (ZYRTEC) 10 mg, oral, Daily    cholecalciferol (Vitamin D-3) 50,000 unit capsule Take 1 capsule by mouth twice weekly on Wednesday and Saturday    clobetasol (Temovate) 0.05 % external solution Apply topically to affected area(s) 2 times a day.    clobetasol (Temovate) 0.05 % ointment Apply topically 2 times a day. Not for use on face, arm pits, or groin    colchicine 0.6 mg  tablet TAKE ONE TABLET BY MOUTH ONCE DAILY FOR GOUT    diazePAM (Valium) 10 mg tablet 1 tablet, oral, 2 times daily    divalproex sprinkle (DEPAKOTE SPRINKLE) 250 mg, oral, Every 8 hours    EnbreL 50 mg, subcutaneous, Once Weekly    fluticasone (Flonase) 50 mcg/actuation nasal spray 1 spray, Each Nostril, Daily, Shake gently. Before first use, prime pump. After use, clean tip and replace cap.    gabapentin (Neurontin) 600 mg tablet Take 1 tablet (600 mg) 3 times a day.    levothyroxine (SYNTHROID, LEVOXYL) 25 mcg, oral, Daily    lidocaine (Xylocaine) 5 % cream cream Apply 1 application topically every 6 hours if needed (to affected pain area).    lurasidone (LATUDA) 20 mg, oral, Daily with breakfast    magnesium hydroxide (Milk of Magnesia) 400 mg/5 mL suspension Take 30 milliliters by mouth every 8 hours as needed for constipation    melatonin (Melatin) 3 mg tablet Take 2 tablets by mouth at bedtime as needed for sleep    QUEtiapine (SEROquel) 25 mg tablet Take 1 tablet (25 mg) by mouth once daily at bedtime for antipsychotic    tiZANidine (Zanaflex) 4 mg tablet oral        Lab Results   Component Value Date    RF <10 03/27/2024    SEDRATE 14 06/14/2024    CRP <0.10 03/27/2024    URICACID 6.1 03/27/2024    CKTOTAL 1,015 (H) 04/02/2023             Malcom Laguerre MD

## 2024-10-07 DIAGNOSIS — G40.909 SEIZURE DISORDER (MULTI): ICD-10-CM

## 2024-10-07 PROCEDURE — RXMED WILLOW AMBULATORY MEDICATION CHARGE

## 2024-10-07 RX ORDER — DIVALPROEX SODIUM 125 MG/1
250 CAPSULE, COATED PELLETS ORAL EVERY 8 HOURS
Qty: 540 CAPSULE | Refills: 3 | Status: SHIPPED | OUTPATIENT
Start: 2024-10-07 | End: 2025-10-07

## 2024-10-10 ENCOUNTER — PHARMACY VISIT (OUTPATIENT)
Dept: PHARMACY | Facility: CLINIC | Age: 64
End: 2024-10-10
Payer: COMMERCIAL

## 2024-10-30 PROCEDURE — RXMED WILLOW AMBULATORY MEDICATION CHARGE

## 2024-10-31 ENCOUNTER — PHARMACY VISIT (OUTPATIENT)
Dept: PHARMACY | Facility: CLINIC | Age: 64
End: 2024-10-31
Payer: COMMERCIAL

## 2024-11-01 ENCOUNTER — TELEPHONE (OUTPATIENT)
Dept: RHEUMATOLOGY | Facility: CLINIC | Age: 64
End: 2024-11-01
Payer: COMMERCIAL

## 2024-11-01 ENCOUNTER — SPECIALTY PHARMACY (OUTPATIENT)
Dept: PHARMACY | Facility: CLINIC | Age: 64
End: 2024-11-01

## 2024-11-01 NOTE — TELEPHONE ENCOUNTER
Phone call from assisted living facility    They need an order for the patient to self inject her enbrel 50mg/ml injection under the skin one time per week     Patient has not been able to take her medication because there is no order    Please fax to Do at [phone]

## 2024-11-05 NOTE — TELEPHONE ENCOUNTER
----- Message from Malcom Laguerre sent at 11/4/2024  2:07 PM EST -----  Regarding: FW: Med Release Notice Request  Could you please print the medication list and fax it to the assisted living facility.    Romulo Laguerre  ----- Message -----  From: Elyl Spain  Sent: 11/4/2024   1:39 PM EST  To: Malcom Laguerre MD  Subject: FW: Med Release Notice Request                     ----- Message -----  From: Los Harrell, IreneD  Sent: 11/1/2024  11:55 AM EST  To: Elly Spain; Sadaf Thompson MA; #  Subject: Med Release Notice Request                       Good morning team,    I was just contacted by Corinne regarding their most recent shipment of Enbrel. The shipment was supposed to be hand delivered to her, but instead was delivered to the  at [facility] and given to the nursing staff. Nursing staff won't release the medication to the patient since Enbrel isn't listed on the patient's med list on their end.    Would you be able to assist with providing some sort of documentation or notice to the nursing staff so that they may document it and release the medication to the patient please? I spoke with CRISTINO Wilder at the facility and she specifically requested documentation for the order itself as well as a notice to release the medication to the patient stating the patient is able to inject the medication themself. If you have any further questions, the facility number is [phone]. The fax number is [phone].    Thank you!    -Mary

## 2024-11-06 ENCOUNTER — TELEPHONE (OUTPATIENT)
Dept: RHEUMATOLOGY | Facility: CLINIC | Age: 64
End: 2024-11-06
Payer: COMMERCIAL

## 2024-11-06 NOTE — TELEPHONE ENCOUNTER
----- Message from Nurse Roz HAMMOND sent at 11/4/2024 12:09 PM EST -----  Regarding: FW: Med Release Notice Request    ----- Message -----  From: Elly Spain  Sent: 11/1/2024   1:41 PM EST  To: Roz Haines RN  Subject: FW: Med Release Notice Request                   FYI regarding Corinne Tanks  ----- Message -----  From: Los Harrell, IreneD  Sent: 11/1/2024  11:55 AM EDT  To: Elly Spain; Sadaf Thompson MA; #  Subject: Med Release Notice Request                       Good morning team,    I was just contacted by [facility] regarding their most recent shipment of Enbrel. The shipment was supposed to be hand delivered to her, but instead was delivered to the  at [facility] and given to the nursing staff. Nursing staff won't release the medication to the patient since Enbrel isn't listed on the patient's med list on their end.    Would you be able to assist with providing some sort of documentation or notice to the nursing staff so that they may document it and release the medication to the patient please? I spoke with CRISTINO Wilder at the facility and she specifically requested documentation for the order itself as well as a notice to release the medication to the patient stating the patient is able to inject the medication themself. If you have any further questions, the facility number is [phone]. The fax number is [phone].    Thank you!    -Mary

## 2024-11-08 ENCOUNTER — TELEPHONE (OUTPATIENT)
Dept: RHEUMATOLOGY | Facility: CLINIC | Age: 64
End: 2024-11-08
Payer: COMMERCIAL

## 2024-11-08 NOTE — TELEPHONE ENCOUNTER
Patient called to apologize- she had another call, but she wanted to know if you could call her back regarding your discussion about a different facility other than [facility]     Very appreciative of your help [phone]

## 2024-11-11 NOTE — TELEPHONE ENCOUNTER
Tried calling back after lunch got no answer, the number the patient is looking for is [phone] its named [facility]

## 2024-11-19 DIAGNOSIS — G90.523 COMPLEX REGIONAL PAIN SYNDROME TYPE 1 OF BOTH LOWER EXTREMITIES: ICD-10-CM

## 2024-11-19 DIAGNOSIS — G90.50 RSD (REFLEX SYMPATHETIC DYSTROPHY): ICD-10-CM

## 2024-11-19 RX ORDER — GABAPENTIN 600 MG/1
TABLET ORAL
Qty: 298 TABLET | Refills: 0 | Status: SHIPPED | OUTPATIENT
Start: 2024-11-19 | End: 2025-03-02

## 2024-11-25 ENCOUNTER — TELEPHONE (OUTPATIENT)
Dept: PRIMARY CARE | Facility: CLINIC | Age: 64
End: 2024-11-25
Payer: COMMERCIAL

## 2024-11-25 PROCEDURE — RXMED WILLOW AMBULATORY MEDICATION CHARGE

## 2024-11-25 NOTE — TELEPHONE ENCOUNTER
Patient called 11/21 asking foir an appointment due to pain and nerve damage in her feet. Message was received 11/22 and patient was called back with no answer. Patient went to ED 11/22 for the nerve pain in feet. Will do a follow up with patient once released.

## 2024-11-26 ENCOUNTER — PHARMACY VISIT (OUTPATIENT)
Dept: PHARMACY | Facility: CLINIC | Age: 64
End: 2024-11-26
Payer: COMMERCIAL

## 2024-11-26 PROCEDURE — RXMED WILLOW AMBULATORY MEDICATION CHARGE

## 2024-11-29 ENCOUNTER — PHARMACY VISIT (OUTPATIENT)
Dept: PHARMACY | Facility: CLINIC | Age: 64
End: 2024-11-29
Payer: COMMERCIAL

## 2024-12-03 DIAGNOSIS — L40.50 PSORIATIC ARTHRITIS (MULTI): ICD-10-CM

## 2024-12-03 RX ORDER — ETANERCEPT 50 MG/ML
50 SOLUTION SUBCUTANEOUS
Qty: 4 ML | Refills: 2 | Status: SHIPPED | OUTPATIENT
Start: 2024-12-08 | End: 2025-03-08

## 2024-12-14 DIAGNOSIS — E03.9 HYPOTHYROIDISM, UNSPECIFIED TYPE: ICD-10-CM

## 2024-12-16 PROCEDURE — RXMED WILLOW AMBULATORY MEDICATION CHARGE

## 2024-12-16 RX ORDER — LEVOTHYROXINE SODIUM 25 UG/1
25 TABLET ORAL DAILY
Qty: 90 TABLET | Refills: 3 | Status: SHIPPED | OUTPATIENT
Start: 2024-12-16 | End: 2025-12-16

## 2024-12-18 ENCOUNTER — PHARMACY VISIT (OUTPATIENT)
Dept: PHARMACY | Facility: CLINIC | Age: 64
End: 2024-12-18
Payer: COMMERCIAL

## 2025-01-03 ENCOUNTER — APPOINTMENT (OUTPATIENT)
Dept: RHEUMATOLOGY | Facility: CLINIC | Age: 65
End: 2025-01-03
Payer: COMMERCIAL

## 2025-01-03 ENCOUNTER — TELEPHONE (OUTPATIENT)
Dept: RHEUMATOLOGY | Facility: CLINIC | Age: 65
End: 2025-01-03

## 2025-01-03 ENCOUNTER — SPECIALTY PHARMACY (OUTPATIENT)
Dept: PHARMACY | Facility: CLINIC | Age: 65
End: 2025-01-03

## 2025-01-03 DIAGNOSIS — L40.50 PSORIATIC ARTHRITIS (MULTI): ICD-10-CM

## 2025-01-03 PROCEDURE — RXMED WILLOW AMBULATORY MEDICATION CHARGE

## 2025-01-03 RX ORDER — ETANERCEPT 50 MG/ML
50 SOLUTION SUBCUTANEOUS
Qty: 4 ML | Refills: 0 | Status: SHIPPED | OUTPATIENT
Start: 2025-01-05 | End: 2025-04-05

## 2025-01-03 NOTE — TELEPHONE ENCOUNTER
Patient had an appointment today but due to the weather couldn't make it can she please get a refill of the medication she said she will call back and get an appointment scheduled.   Ask that it goes to  specialty pharmacy

## 2025-01-07 ENCOUNTER — PHARMACY VISIT (OUTPATIENT)
Dept: PHARMACY | Facility: CLINIC | Age: 65
End: 2025-01-07
Payer: COMMERCIAL

## 2025-01-10 ENCOUNTER — TELEPHONE (OUTPATIENT)
Dept: PRIMARY CARE | Facility: CLINIC | Age: 65
End: 2025-01-10
Payer: COMMERCIAL

## 2025-01-10 DIAGNOSIS — N30.00 ACUTE CYSTITIS WITHOUT HEMATURIA: Primary | ICD-10-CM

## 2025-01-10 RX ORDER — NITROFURANTOIN 25; 75 MG/1; MG/1
100 CAPSULE ORAL 2 TIMES DAILY
Qty: 14 CAPSULE | Refills: 0 | Status: SHIPPED | OUTPATIENT
Start: 2025-01-10 | End: 2025-01-17

## 2025-01-15 ENCOUNTER — APPOINTMENT (OUTPATIENT)
Dept: PRIMARY CARE | Facility: CLINIC | Age: 65
End: 2025-01-15
Payer: COMMERCIAL

## 2025-01-15 ENCOUNTER — PHARMACY VISIT (OUTPATIENT)
Dept: PHARMACY | Facility: CLINIC | Age: 65
End: 2025-01-15
Payer: COMMERCIAL

## 2025-01-15 ENCOUNTER — LAB (OUTPATIENT)
Dept: LAB | Facility: LAB | Age: 65
End: 2025-01-15
Payer: COMMERCIAL

## 2025-01-15 VITALS
HEART RATE: 102 BPM | TEMPERATURE: 97.2 F | HEIGHT: 65 IN | WEIGHT: 191 LBS | DIASTOLIC BLOOD PRESSURE: 64 MMHG | SYSTOLIC BLOOD PRESSURE: 92 MMHG | BODY MASS INDEX: 31.82 KG/M2

## 2025-01-15 DIAGNOSIS — R73.9 HYPERGLYCEMIA: ICD-10-CM

## 2025-01-15 DIAGNOSIS — E53.8 VITAMIN B12 DEFICIENCY: ICD-10-CM

## 2025-01-15 DIAGNOSIS — Z00.00 ANNUAL PHYSICAL EXAM: Primary | ICD-10-CM

## 2025-01-15 DIAGNOSIS — Z12.11 ENCOUNTER FOR SCREENING FOR MALIGNANT NEOPLASM OF COLON: ICD-10-CM

## 2025-01-15 DIAGNOSIS — G90.523 COMPLEX REGIONAL PAIN SYNDROME TYPE 1 OF BOTH LOWER EXTREMITIES: ICD-10-CM

## 2025-01-15 DIAGNOSIS — R53.83 OTHER FATIGUE: ICD-10-CM

## 2025-01-15 DIAGNOSIS — H90.3 SENSORINEURAL HEARING LOSS (SNHL) OF BOTH EARS: ICD-10-CM

## 2025-01-15 DIAGNOSIS — R39.15 URINARY URGENCY: ICD-10-CM

## 2025-01-15 DIAGNOSIS — G40.909 SEIZURE DISORDER (MULTI): ICD-10-CM

## 2025-01-15 DIAGNOSIS — Z12.31 SCREENING MAMMOGRAM FOR BREAST CANCER: ICD-10-CM

## 2025-01-15 DIAGNOSIS — G90.50 RSD (REFLEX SYMPATHETIC DYSTROPHY): ICD-10-CM

## 2025-01-15 DIAGNOSIS — R35.0 URINARY FREQUENCY: ICD-10-CM

## 2025-01-15 DIAGNOSIS — Z00.00 ANNUAL PHYSICAL EXAM: ICD-10-CM

## 2025-01-15 LAB
ALBUMIN SERPL BCP-MCNC: 4.5 G/DL (ref 3.4–5)
ALP SERPL-CCNC: 76 U/L (ref 33–136)
ALT SERPL W P-5'-P-CCNC: 6 U/L (ref 7–45)
ANION GAP SERPL CALC-SCNC: 17 MMOL/L (ref 10–20)
AST SERPL W P-5'-P-CCNC: 12 U/L (ref 9–39)
BASOPHILS # BLD AUTO: 0.02 X10*3/UL (ref 0–0.1)
BASOPHILS NFR BLD AUTO: 0.3 %
BILIRUB SERPL-MCNC: 0.4 MG/DL (ref 0–1.2)
BUN SERPL-MCNC: 19 MG/DL (ref 6–23)
CALCIUM SERPL-MCNC: 9.8 MG/DL (ref 8.6–10.6)
CHLORIDE SERPL-SCNC: 105 MMOL/L (ref 98–107)
CO2 SERPL-SCNC: 26 MMOL/L (ref 21–32)
CREAT SERPL-MCNC: 1.01 MG/DL (ref 0.5–1.05)
EGFRCR SERPLBLD CKD-EPI 2021: 63 ML/MIN/1.73M*2
EOSINOPHIL # BLD AUTO: 0.45 X10*3/UL (ref 0–0.7)
EOSINOPHIL NFR BLD AUTO: 5.7 %
ERYTHROCYTE [DISTWIDTH] IN BLOOD BY AUTOMATED COUNT: 12.4 % (ref 11.5–14.5)
GLUCOSE SERPL-MCNC: 61 MG/DL (ref 74–99)
HCT VFR BLD AUTO: 40.4 % (ref 36–46)
HGB BLD-MCNC: 13.4 G/DL (ref 12–16)
IMM GRANULOCYTES # BLD AUTO: 0.02 X10*3/UL (ref 0–0.7)
IMM GRANULOCYTES NFR BLD AUTO: 0.3 % (ref 0–0.9)
LYMPHOCYTES # BLD AUTO: 1.68 X10*3/UL (ref 1.2–4.8)
LYMPHOCYTES NFR BLD AUTO: 21.2 %
MCH RBC QN AUTO: 33.3 PG (ref 26–34)
MCHC RBC AUTO-ENTMCNC: 33.2 G/DL (ref 32–36)
MCV RBC AUTO: 101 FL (ref 80–100)
MONOCYTES # BLD AUTO: 0.56 X10*3/UL (ref 0.1–1)
MONOCYTES NFR BLD AUTO: 7.1 %
NEUTROPHILS # BLD AUTO: 5.19 X10*3/UL (ref 1.2–7.7)
NEUTROPHILS NFR BLD AUTO: 65.4 %
NRBC BLD-RTO: 0 /100 WBCS (ref 0–0)
PLATELET # BLD AUTO: 277 X10*3/UL (ref 150–450)
POTASSIUM SERPL-SCNC: 4.1 MMOL/L (ref 3.5–5.3)
PROT SERPL-MCNC: 7.6 G/DL (ref 6.4–8.2)
RBC # BLD AUTO: 4.02 X10*6/UL (ref 4–5.2)
SODIUM SERPL-SCNC: 144 MMOL/L (ref 136–145)
TSH SERPL-ACNC: 1.34 MIU/L (ref 0.44–3.98)
WBC # BLD AUTO: 7.9 X10*3/UL (ref 4.4–11.3)

## 2025-01-15 PROCEDURE — G0439 PPPS, SUBSEQ VISIT: HCPCS | Performed by: INTERNAL MEDICINE

## 2025-01-15 PROCEDURE — 84443 ASSAY THYROID STIM HORMONE: CPT

## 2025-01-15 PROCEDURE — RXMED WILLOW AMBULATORY MEDICATION CHARGE

## 2025-01-15 PROCEDURE — 85025 COMPLETE CBC W/AUTO DIFF WBC: CPT

## 2025-01-15 PROCEDURE — 96372 THER/PROPH/DIAG INJ SC/IM: CPT | Performed by: INTERNAL MEDICINE

## 2025-01-15 PROCEDURE — 3008F BODY MASS INDEX DOCD: CPT | Performed by: INTERNAL MEDICINE

## 2025-01-15 PROCEDURE — 1036F TOBACCO NON-USER: CPT | Performed by: INTERNAL MEDICINE

## 2025-01-15 PROCEDURE — 80053 COMPREHEN METABOLIC PANEL: CPT

## 2025-01-15 PROCEDURE — 83036 HEMOGLOBIN GLYCOSYLATED A1C: CPT

## 2025-01-15 PROCEDURE — 99214 OFFICE O/P EST MOD 30 MIN: CPT | Performed by: INTERNAL MEDICINE

## 2025-01-15 RX ORDER — GABAPENTIN 800 MG/1
800 TABLET ORAL 3 TIMES DAILY
Qty: 270 TABLET | Refills: 3 | Status: SHIPPED | OUTPATIENT
Start: 2025-01-15 | End: 2026-01-15

## 2025-01-15 RX ORDER — DIVALPROEX SODIUM 125 MG/1
250 CAPSULE, COATED PELLETS ORAL EVERY 8 HOURS
Qty: 540 CAPSULE | Refills: 3 | Status: SHIPPED | OUTPATIENT
Start: 2025-01-15 | End: 2026-01-15

## 2025-01-15 RX ORDER — CYANOCOBALAMIN 1000 UG/ML
1000 INJECTION, SOLUTION INTRAMUSCULAR; SUBCUTANEOUS ONCE
Status: COMPLETED | OUTPATIENT
Start: 2025-01-15 | End: 2025-01-15

## 2025-01-15 RX ADMIN — CYANOCOBALAMIN 1000 MCG: 1000 INJECTION, SOLUTION INTRAMUSCULAR; SUBCUTANEOUS at 14:53

## 2025-01-15 ASSESSMENT — ENCOUNTER SYMPTOMS
COUGH: 0
PALPITATIONS: 0
FEVER: 0
CHILLS: 0
SHORTNESS OF BREATH: 0
POLYDIPSIA: 0

## 2025-01-15 ASSESSMENT — ACTIVITIES OF DAILY LIVING (ADL)
GROCERY_SHOPPING: NEEDS ASSISTANCE
DRESSING: INDEPENDENT
DOING_HOUSEWORK: NEEDS ASSISTANCE
TAKING_MEDICATION: NEEDS ASSISTANCE
BATHING: INDEPENDENT
MANAGING_FINANCES: NEEDS ASSISTANCE

## 2025-01-15 ASSESSMENT — PAIN SCALES - GENERAL: PAINLEVEL_OUTOF10: 0-NO PAIN

## 2025-01-15 ASSESSMENT — PATIENT HEALTH QUESTIONNAIRE - PHQ9
SUM OF ALL RESPONSES TO PHQ9 QUESTIONS 1 AND 2: 2
2. FEELING DOWN, DEPRESSED OR HOPELESS: SEVERAL DAYS
1. LITTLE INTEREST OR PLEASURE IN DOING THINGS: SEVERAL DAYS

## 2025-01-15 NOTE — ASSESSMENT & PLAN NOTE
Orders:    divalproex sprinkle (Depakote Sprinkle) 125 mg DR capsule; Take 2 capsules (250 mg) by mouth every 8 hours.

## 2025-01-15 NOTE — ASSESSMENT & PLAN NOTE
Orders:    gabapentin (Neurontin) 800 mg tablet; Take 1 tablet (800 mg) by mouth 3 times a day.

## 2025-01-15 NOTE — PROGRESS NOTES
Subjective   Patient ID: Corinne L Tanks is a 65 y.o. female who presents for Annual Exam.    Patient presents today for an annual wellness exam    Medical history and surgical history updated today  Medication list reconciled and updated  Patient denies vision issues at this time, only wears reading glasses.   Follows with a dentist: Yes    Patient counseled about available preventative health vaccinations and provided with opportunity to update them with our office or through prescription to preferred pharmacy.    Reviewed and discussed preventative health screening recommendations for colon cancer: due ordered and to be scheduled    Reviewed and discussed preventative health recommendations for screening for cervical cancer and breast cancer: discussed and ordered    Patient has reflex sympathetic dystrophy and chronic nerve pain in both feet.  The pain is severe and chronic she has pain management appointment in February which we appreciate any input they have.  We have been working to titrate her gabapentin she is currently on 600 mg 3 times daily has been tolerating it well with no complications although reports very little to no significant improvement in pain symptoms.  She has normal renal function for her age with a creatinine clearance greater than 60 by most recent testing.  She is due for updated blood work which I have ordered.  We will continue titrating the gabapentin as the patient tolerates.  Appreciate any pain management input regarding this patient's complex disorder.    No seizure-like activity while on the gabapentin and valproic acid.  She reports that unfortunately her assisted living facility where she resides has misplaced her medication, they are responsible for administering her medications and maintaining it she reports she needed an approval of early refill for the Depakote as a result, which I have authorized.  No history of medication abuse normal Depakote levels in the past and the  "facility that which she resides and is responsible for managing her medications.         Review of Systems   Constitutional:  Negative for chills and fever.   Respiratory:  Negative for cough and shortness of breath.    Cardiovascular:  Negative for chest pain and palpitations.   Endocrine: Negative for polydipsia and polyuria.       Objective   BP 92/64 (BP Location: Right arm, Patient Position: Sitting, BP Cuff Size: Adult)   Pulse 102   Temp 36.2 °C (97.2 °F) (Temporal)   Ht 1.651 m (5' 5\")   Wt 86.6 kg (191 lb)   BMI 31.78 kg/m²     Physical Exam  Constitutional:       Appearance: Normal appearance.   HENT:      Head: Normocephalic and atraumatic.      Right Ear: Tympanic membrane normal.      Left Ear: Tympanic membrane normal.      Nose: Nose normal.      Mouth/Throat:      Mouth: Mucous membranes are moist.   Eyes:      Extraocular Movements: Extraocular movements intact.      Conjunctiva/sclera: Conjunctivae normal.      Pupils: Pupils are equal, round, and reactive to light.   Neck:      Thyroid: No thyroid mass, thyromegaly or thyroid tenderness.      Vascular: No carotid bruit.   Cardiovascular:      Rate and Rhythm: Normal rate and regular rhythm.      Heart sounds: No murmur heard.     No friction rub. No gallop.   Pulmonary:      Effort: Pulmonary effort is normal. No respiratory distress.      Breath sounds: No wheezing, rhonchi or rales.   Abdominal:      General: Bowel sounds are normal.      Palpations: Abdomen is soft.      Tenderness: There is no abdominal tenderness. There is no guarding.      Hernia: No hernia is present.   Musculoskeletal:      Cervical back: Neck supple. No tenderness.      Right lower leg: No edema.      Left lower leg: No edema.   Lymphadenopathy:      Cervical: No cervical adenopathy.   Skin:     Coloration: Skin is not jaundiced.   Neurological:      General: No focal deficit present.      Mental Status: She is alert and oriented to person, place, and time.      " Sensory: No sensory deficit.      Motor: Weakness (Doriflexion and plantarflexion both feet but R>L in weakness.) present.      Coordination: Coordination normal.      Gait: Gait abnormal (uses walker).   Psychiatric:         Mood and Affect: Mood normal.         Assessment/Plan   Assessment & Plan  Annual physical exam    Orders:    CBC and Auto Differential; Future    Comprehensive Metabolic Panel; Future    Hemoglobin A1C; Future    TSH with reflex to Free T4 if abnormal; Future    Urinary frequency    Orders:    Urinalysis with Reflex Culture and Microscopic; Future    CBC and Auto Differential; Future    Comprehensive Metabolic Panel; Future    Hemoglobin A1C; Future    TSH with reflex to Free T4 if abnormal; Future    Urinary urgency    Orders:    Urinalysis with Reflex Culture and Microscopic; Future    Other fatigue    Orders:    CBC and Auto Differential; Future    Comprehensive Metabolic Panel; Future    Hemoglobin A1C; Future    TSH with reflex to Free T4 if abnormal; Future    RSD (reflex sympathetic dystrophy)    Orders:    gabapentin (Neurontin) 800 mg tablet; Take 1 tablet (800 mg) by mouth 3 times a day.    Seizure disorder (Multi)    Orders:    divalproex sprinkle (Depakote Sprinkle) 125 mg DR capsule; Take 2 capsules (250 mg) by mouth every 8 hours.    Complex regional pain syndrome type 1 of both lower extremities    Orders:    gabapentin (Neurontin) 800 mg tablet; Take 1 tablet (800 mg) by mouth 3 times a day.    Hyperglycemia    Orders:    CBC and Auto Differential; Future    Comprehensive Metabolic Panel; Future    Hemoglobin A1C; Future    TSH with reflex to Free T4 if abnormal; Future    Screening mammogram for breast cancer    Orders:    BI mammo bilateral screening tomosynthesis; Future    Encounter for screening for malignant neoplasm of colon         Vitamin B12 deficiency    Orders:    cyanocobalamin (Vitamin B-12) injection 1,000 mcg    Sensorineural hearing loss (SNHL) of both  ears    Orders:    Referral to ENT; Future

## 2025-01-16 LAB
EST. AVERAGE GLUCOSE BLD GHB EST-MCNC: 88 MG/DL
HBA1C MFR BLD: 4.7 %

## 2025-01-29 ENCOUNTER — SPECIALTY PHARMACY (OUTPATIENT)
Dept: PHARMACY | Facility: CLINIC | Age: 65
End: 2025-01-29

## 2025-01-29 DIAGNOSIS — L40.50 PSORIATIC ARTHRITIS (MULTI): ICD-10-CM

## 2025-01-29 PROCEDURE — RXMED WILLOW AMBULATORY MEDICATION CHARGE

## 2025-01-29 RX ORDER — ETANERCEPT 50 MG/ML
50 SOLUTION SUBCUTANEOUS
Qty: 4 ML | Refills: 0 | Status: SHIPPED | OUTPATIENT
Start: 2025-02-02 | End: 2025-05-03

## 2025-02-06 ENCOUNTER — SPECIALTY PHARMACY (OUTPATIENT)
Dept: PHARMACY | Facility: CLINIC | Age: 65
End: 2025-02-06

## 2025-02-07 ENCOUNTER — PHARMACY VISIT (OUTPATIENT)
Dept: PHARMACY | Facility: CLINIC | Age: 65
End: 2025-02-07
Payer: COMMERCIAL

## 2025-03-03 ENCOUNTER — APPOINTMENT (OUTPATIENT)
Dept: GASTROENTEROLOGY | Facility: HOSPITAL | Age: 65
End: 2025-03-03
Payer: COMMERCIAL

## 2025-03-04 ENCOUNTER — SPECIALTY PHARMACY (OUTPATIENT)
Dept: PHARMACY | Facility: CLINIC | Age: 65
End: 2025-03-04

## 2025-03-04 DIAGNOSIS — L40.50 PSORIATIC ARTHRITIS (MULTI): ICD-10-CM

## 2025-03-04 RX ORDER — ETANERCEPT 50 MG/ML
50 SOLUTION SUBCUTANEOUS
Qty: 4 ML | Refills: 0 | OUTPATIENT
Start: 2025-03-09 | End: 2025-06-07

## 2025-03-12 PROCEDURE — RXMED WILLOW AMBULATORY MEDICATION CHARGE

## 2025-03-20 DIAGNOSIS — G40.909 SEIZURE DISORDER (MULTI): ICD-10-CM

## 2025-03-20 RX ORDER — DIVALPROEX SODIUM 125 MG/1
250 CAPSULE, COATED PELLETS ORAL EVERY 8 HOURS
Qty: 540 CAPSULE | Refills: 3 | Status: SHIPPED | OUTPATIENT
Start: 2025-03-20 | End: 2026-03-20

## 2025-03-21 ENCOUNTER — PHARMACY VISIT (OUTPATIENT)
Dept: PHARMACY | Facility: CLINIC | Age: 65
End: 2025-03-21
Payer: COMMERCIAL

## 2025-03-24 PROCEDURE — RXMED WILLOW AMBULATORY MEDICATION CHARGE

## 2025-03-25 ENCOUNTER — PHARMACY VISIT (OUTPATIENT)
Dept: PHARMACY | Facility: CLINIC | Age: 65
End: 2025-03-25
Payer: COMMERCIAL

## 2025-04-02 ENCOUNTER — PHARMACY VISIT (OUTPATIENT)
Dept: PHARMACY | Facility: CLINIC | Age: 65
End: 2025-04-02
Payer: COMMERCIAL

## 2025-04-02 PROCEDURE — RXMED WILLOW AMBULATORY MEDICATION CHARGE

## 2025-04-08 ENCOUNTER — HOSPITAL ENCOUNTER (OUTPATIENT)
Dept: RADIOLOGY | Facility: HOSPITAL | Age: 65
Discharge: HOME | End: 2025-04-08
Payer: COMMERCIAL

## 2025-04-08 ENCOUNTER — PROCEDURE VISIT (OUTPATIENT)
Dept: PODIATRY | Facility: CLINIC | Age: 65
End: 2025-04-08
Payer: COMMERCIAL

## 2025-04-08 DIAGNOSIS — M19.171 POST-TRAUMATIC OSTEOARTHRITIS OF RIGHT FOOT: ICD-10-CM

## 2025-04-08 DIAGNOSIS — S90.221S: ICD-10-CM

## 2025-04-08 DIAGNOSIS — G90.50 RSD (REFLEX SYMPATHETIC DYSTROPHY): ICD-10-CM

## 2025-04-08 DIAGNOSIS — I73.9 PVD (PERIPHERAL VASCULAR DISEASE) (CMS-HCC): ICD-10-CM

## 2025-04-08 DIAGNOSIS — L60.1 ONYCHOLYSIS: ICD-10-CM

## 2025-04-08 DIAGNOSIS — M79.672 FOOT PAIN, BILATERAL: Primary | ICD-10-CM

## 2025-04-08 DIAGNOSIS — M20.21 HALLUX RIGIDUS, RIGHT FOOT: ICD-10-CM

## 2025-04-08 DIAGNOSIS — M79.671 FOOT PAIN, BILATERAL: Primary | ICD-10-CM

## 2025-04-08 DIAGNOSIS — L60.3 NAIL DYSTROPHY: ICD-10-CM

## 2025-04-08 PROCEDURE — 73630 X-RAY EXAM OF FOOT: CPT | Mod: RIGHT SIDE | Performed by: RADIOLOGY

## 2025-04-08 PROCEDURE — 99205 OFFICE O/P NEW HI 60 MIN: CPT | Performed by: PODIATRIST

## 2025-04-08 PROCEDURE — 73630 X-RAY EXAM OF FOOT: CPT | Mod: RT

## 2025-04-08 PROCEDURE — 99215 OFFICE O/P EST HI 40 MIN: CPT | Performed by: PODIATRIST

## 2025-04-08 RX ORDER — QUETIAPINE FUMARATE 100 MG/1
TABLET, FILM COATED ORAL
COMMUNITY
Start: 2025-02-06

## 2025-04-08 RX ORDER — LURASIDONE HYDROCHLORIDE 40 MG/1
TABLET, FILM COATED ORAL
COMMUNITY
Start: 2025-04-04

## 2025-04-08 RX ORDER — LORAZEPAM 0.5 MG/1
TABLET ORAL
COMMUNITY
Start: 2025-02-06

## 2025-04-08 ASSESSMENT — ENCOUNTER SYMPTOMS
ALLERGIC/IMMUNOLOGIC NEGATIVE: 1
ENDOCRINE NEGATIVE: 1
ARTHRALGIAS: 1
JOINT SWELLING: 1
GASTROINTESTINAL NEGATIVE: 1
RESPIRATORY NEGATIVE: 1
CARDIOVASCULAR NEGATIVE: 1
EYES NEGATIVE: 1
COLOR CHANGE: 1
CONSTITUTIONAL NEGATIVE: 1
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
## 2025-04-14 ENCOUNTER — TELEPHONE (OUTPATIENT)
Dept: PODIATRY | Facility: CLINIC | Age: 65
End: 2025-04-14
Payer: COMMERCIAL

## 2025-04-14 NOTE — TELEPHONE ENCOUNTER
I ordered PVR before considering removal of the nail plate.  She will need to follow-up as I discussed with her.

## 2025-04-14 NOTE — TELEPHONE ENCOUNTER
Spoke with pt stated her nail is still black and not falling off on its own, cracked down the middle, Concerned for infection. Would like to know why  Did not remove nail when she was in office last week

## 2025-04-15 ENCOUNTER — APPOINTMENT (OUTPATIENT)
Dept: PRIMARY CARE | Facility: CLINIC | Age: 65
End: 2025-04-15
Payer: COMMERCIAL

## 2025-04-22 ENCOUNTER — HOSPITAL ENCOUNTER (OUTPATIENT)
Dept: RADIOLOGY | Facility: CLINIC | Age: 65
Discharge: HOME | End: 2025-04-22
Payer: COMMERCIAL

## 2025-04-22 ENCOUNTER — APPOINTMENT (OUTPATIENT)
Dept: RHEUMATOLOGY | Facility: CLINIC | Age: 65
End: 2025-04-22
Payer: COMMERCIAL

## 2025-04-22 VITALS
SYSTOLIC BLOOD PRESSURE: 91 MMHG | HEIGHT: 65 IN | BODY MASS INDEX: 30.16 KG/M2 | WEIGHT: 181 LBS | DIASTOLIC BLOOD PRESSURE: 69 MMHG | OXYGEN SATURATION: 93 % | HEART RATE: 107 BPM

## 2025-04-22 DIAGNOSIS — R79.9 ABNORMAL FINDING OF BLOOD CHEMISTRY, UNSPECIFIED: ICD-10-CM

## 2025-04-22 DIAGNOSIS — M12.9 ARTHROPATHY: ICD-10-CM

## 2025-04-22 DIAGNOSIS — M12.9 ARTHROPATHY: Primary | ICD-10-CM

## 2025-04-22 DIAGNOSIS — M81.0 AGE RELATED OSTEOPOROSIS, UNSPECIFIED PATHOLOGICAL FRACTURE PRESENCE: ICD-10-CM

## 2025-04-22 PROCEDURE — 73130 X-RAY EXAM OF HAND: CPT | Mod: BILATERAL PROCEDURE | Performed by: STUDENT IN AN ORGANIZED HEALTH CARE EDUCATION/TRAINING PROGRAM

## 2025-04-22 PROCEDURE — 1036F TOBACCO NON-USER: CPT | Performed by: INTERNAL MEDICINE

## 2025-04-22 PROCEDURE — 99214 OFFICE O/P EST MOD 30 MIN: CPT | Performed by: INTERNAL MEDICINE

## 2025-04-22 PROCEDURE — 73130 X-RAY EXAM OF HAND: CPT | Mod: 50

## 2025-04-22 PROCEDURE — 3008F BODY MASS INDEX DOCD: CPT | Performed by: INTERNAL MEDICINE

## 2025-04-22 PROCEDURE — 72202 X-RAY EXAM SI JOINTS 3/> VWS: CPT

## 2025-04-22 PROCEDURE — 72202 X-RAY EXAM SI JOINTS 3/> VWS: CPT | Performed by: STUDENT IN AN ORGANIZED HEALTH CARE EDUCATION/TRAINING PROGRAM

## 2025-04-22 ASSESSMENT — PAIN SCALES - GENERAL: PAINLEVEL_OUTOF10: 9

## 2025-04-22 NOTE — PROGRESS NOTES
Initial Rheumatology Patient Visit    Chief Complaint:  Corinne Louise Tanks is a 65 y.o. female presenting today for New patient vist.    History of Presenting Problem:   65 y.o.  female with history of  presume psoriatic arthritis, remote Hx of Gout  FMS, lumbar spondylosis with radiculopathy, hypotension, depression/bipolar disorder, seizure disorder, RSD, and hypothyroidism presented to [facility] care. She report she use to be on Enbrel in the past but has been off the medication for about 8 months. Her main concern is her skin, she report she typically breaks out in the summer, typically in her elbows, knees and sclap.  She report stiffness in her hands and occasional in her knees. She reprot she takes Advil 400 mg as needed twice a day which does not help.  Patient does deal with generalized pain issues    Problem List:   Patient Active Problem List   Diagnosis    Anemia, pernicious    Bipolar disorder, current episode manic without psychotic features    Carpal tunnel syndrome    Complex regional pain syndrome of lower extremity    Headache    RSD (reflex sympathetic dystrophy)    Falls frequently    Gait disturbance    Hypotension    Hypothyroidism    Lumbago    Lumbar radiculopathy    Lumbar spondylosis    Malaise and fatigue    Psoriasis    Renal dysfunction    Scoliosis    Seizure disorder (Multi)    Spondylolisthesis, acquired       Past Medical History: Medical History[1]    Surgical History: Surgical History[2]     Allergies: Allergies[3]    Medications: Current Medications[4]    Review of Systems:   ROS: She report  Morning stiffness of less than 10 mins , dry eyes and mouth, Hx of chronic sinusitis, Hx of ear inflammation, oral, nasal or genital ulcers, Denies sun sensitivity, Raynaud's phenomenon. Denies Uveitis or recent vision changes. Denies new rashes or Hx of Psoriasis, Denies alopecia,   Denies Chest pain/SOB, cough, Hx of asthma, Denies Fever/chills, she does have sweats, Denies Fatigue,  "Denies weight loss  Denies abdominal pain, New onset Dyspepsia, dysphasia, nausea/vomiting/diarrhea, dark/tarry stools, Denies blood in stools or urine. She report  Chronic back pain.   Denies Hx of blood clot. Denies Hx of easy bruising or bleeding. Denies Headaches, Denies numbness and tingling, weakness.  All other systems have been reviewed and are negative for complaint.   Sister with Lupus    Objective   Physical Examination:    Visit Vitals  BP 91/69   Pulse 107   Ht 1.651 m (5' 5\")   Wt 82.1 kg (181 lb)   SpO2 93%   BMI 30.12 kg/m²   OB Status Postmenopausal   Smoking Status Never   BSA 1.94 m²        Gen: NAD, A&O x 3  HEENT: clear sclera and conjunctiva,     Musculoskeletal:   Neck; WNL, full ROM  Shoulder: WNL, full ROM  Elbow:WNL, full ROM, no effusion noted  Wrist and fingers;no active synovitis noted, Full ROM in the Wrist , Good fist and   Knees:  No effusions or crepitation, full ROM.  Hips; WNL, full ROM, Negative Tarik test  Ankle, Feet; WNL, full ROM    Skin: No rashes or lesions seen, no nail changes  Neuro: A&O x3, Normal Gait    Procedures :None    Orders:  Orders Placed This Encounter   Procedures    XR hand 3+ views bilateral    XR sacroiliac joints 3+ views    XR DEXA bone density    Uric Acid    JAY with Reflex to CHANDA    C-Reactive Protein    Sedimentation Rate    Hepatic Function Panel    CBC and Auto Differential        Provider Impression:   Assessment/Plan   Encounter Diagnoses   Name Primary?    Arthropathy Yes    Abnormal finding of blood chemistry, unspecified     Age related osteoporosis, unspecified pathological fracture presence         65 y.o.  female with history of  presume psoriatic arthritis, remote Hx of Gout  FMS, lumbar spondylosis with radiculopathy, hypotension, depression/bipolar disorder, seizure disorder, RSD, and hypothyroidism presented to establish care.  Patient's main concern with regards to her psoriasis that breaks out during the summer which she is " hoping to get biologic for will refer to dermatology for further evaluation of her skin issues.  On exam patient has no inflammatory joint findings suspect underlying fibromyalgia to explain most of her joint symptoms however we will obtain additional serologies patient has had negative rheumatology serology testing in the past.  - Will check additional serologies including lupus the patient is interested in having testing for lupus again   -obtain screening imaging of her joints given her sisters history  -Recommend a bone density  - Follow-up based on testing       [1]   Past Medical History:  Diagnosis Date    Personal history of other mental and behavioral disorders     History of depression    Personal history of other specified conditions 08/08/2019    History of seizure    Personal history of pneumonia (recurrent)     History of aspiration pneumonia    Radiculopathy, lumbar region 12/05/2022    Lumbar radiculopathy   [2]   Past Surgical History:  Procedure Laterality Date    OTHER SURGICAL HISTORY  06/19/2020    Pulmonary decortication    OTHER SURGICAL HISTORY  06/19/2020    Lung wedge resection    OTHER SURGICAL HISTORY  06/19/2020    Thoracentesis   [3]   Allergies  Allergen Reactions    Tramadol Unknown    Carbamazepine Rash   [4]   Current Outpatient Medications:     cetirizine (ZyrTEC) 10 mg tablet, Take 1 tablet (10 mg) by mouth once daily., Disp: 90 tablet, Rfl: 1    cholecalciferol (Vitamin D-3) 50,000 unit capsule, Take 1 capsule by mouth twice weekly on Wednesday and Saturday, Disp: 2 capsule, Rfl: 0    clobetasol (Temovate) 0.05 % external solution, Apply topically to affected area(s) 2 times a day., Disp: 60 mL, Rfl: 2    clobetasol (Temovate) 0.05 % ointment, Apply topically 2 times a day. Not for use on face, arm pits, or groin, Disp: 60 g, Rfl: 2    divalproex sprinkle (Depakote Sprinkle) 125 mg DR capsule, Take 2 capsules (250 mg) by mouth every 8 hours., Disp: 540 capsule, Rfl: 3    EnbreL  50 mg/mL (1 mL) injection, Inject 1 mL (50 mg) under the skin 1 (one) time per week., Disp: 4 mL, Rfl: 0    gabapentin (Neurontin) 800 mg tablet, Take 1 tablet (800 mg) by mouth 3 times a day., Disp: 270 tablet, Rfl: 3    levothyroxine (Synthroid, Levoxyl) 25 mcg tablet, Take 1 tablet (25 mcg) by mouth once daily., Disp: 90 tablet, Rfl: 3    LORazepam (Ativan) 0.5 mg tablet, , Disp: , Rfl:     lurasidone (Latuda) 40 mg tablet, , Disp: , Rfl:     QUEtiapine (SEROquel) 100 mg tablet, , Disp: , Rfl:     colchicine 0.6 mg tablet, TAKE ONE TABLET BY MOUTH ONCE DAILY FOR GOUT, Disp: 30 tablet, Rfl: 2    divalproex sprinkle (Depakote Sprinkle) 125 mg DR capsule, Take 2 capsules (250 mg) by mouth every 8 hours., Disp: 540 capsule, Rfl: 3    fluticasone (Flonase) 50 mcg/actuation nasal spray, Administer 1 spray into each nostril once daily. Shake gently. Before first use, prime pump. After use, clean tip and replace cap., Disp: 16 g, Rfl: 5    lidocaine (Xylocaine) 5 % cream cream, Apply 1 application topically every 6 hours if needed (to affected pain area). (Patient not taking: Reported on 4/22/2025), Disp: 45 g, Rfl: 3    magnesium hydroxide (Milk of Magnesia) 400 mg/5 mL suspension, Take 30 milliliters by mouth every 8 hours as needed for constipation, Disp: 355 mL, Rfl: 0    melatonin (Melatin) 3 mg tablet, Take 2 tablets by mouth at bedtime as needed for sleep, Disp: 240 tablet, Rfl: 0    QUEtiapine (SEROquel) 25 mg tablet, Take 1 tablet (25 mg) by mouth once daily at bedtime for antipsychotic, Disp: 241 tablet, Rfl: 0

## 2025-04-25 ENCOUNTER — SPECIALTY PHARMACY (OUTPATIENT)
Dept: PHARMACY | Facility: CLINIC | Age: 65
End: 2025-04-25

## 2025-04-30 ENCOUNTER — DOCUMENTATION (OUTPATIENT)
Dept: PRIMARY CARE | Facility: CLINIC | Age: 65
End: 2025-04-30
Payer: COMMERCIAL

## 2025-05-01 ENCOUNTER — PATIENT OUTREACH (OUTPATIENT)
Dept: PRIMARY CARE | Facility: CLINIC | Age: 65
End: 2025-05-01
Payer: COMMERCIAL

## 2025-05-01 NOTE — PROGRESS NOTES
Discharge Facility: [facility]  Discharge Diagnosis: accident due to mechanical fall without injury; cognitive change; seizure disorder; JAQUELINE (acute kidney injury)  Admission Date: 4/27/25  Discharge Date:  4/29/25    PCP Appointment Date: TBD - tasked to PCP office  Specialist Appointment Date: n/a  Hospital Encounter and Summary Linked: Yes  Hospital Encounter     At least two attempts were made to reach patient within two business days after discharge. If available, left voicemail with contact information for patient to call back with any non-emergent questions or concerns.

## 2025-05-06 ENCOUNTER — APPOINTMENT (OUTPATIENT)
Dept: VASCULAR MEDICINE | Facility: HOSPITAL | Age: 65
End: 2025-05-06
Payer: COMMERCIAL

## 2025-05-06 ENCOUNTER — HOSPITAL ENCOUNTER (OUTPATIENT)
Dept: VASCULAR MEDICINE | Facility: HOSPITAL | Age: 65
Discharge: HOME | End: 2025-05-06
Payer: COMMERCIAL

## 2025-05-06 DIAGNOSIS — I73.9 PVD (PERIPHERAL VASCULAR DISEASE) (CMS-HCC): ICD-10-CM

## 2025-05-06 PROCEDURE — 93922 UPR/L XTREMITY ART 2 LEVELS: CPT | Performed by: SURGERY

## 2025-05-06 PROCEDURE — 93922 UPR/L XTREMITY ART 2 LEVELS: CPT

## 2025-05-30 ENCOUNTER — PATIENT OUTREACH (OUTPATIENT)
Dept: PRIMARY CARE | Facility: CLINIC | Age: 65
End: 2025-05-30
Payer: COMMERCIAL

## 2025-05-30 NOTE — PROGRESS NOTES
TCM RN Outreach  Unable to reach patient for follow up call after recent hospitalization.   Left voicemail with call back number for patient to call if needed   If no voicemail available call attempts x 2 were made to contact the patient to assist with any questions or concerns patient may have.

## 2025-06-06 ENCOUNTER — HOSPITAL ENCOUNTER (EMERGENCY)
Facility: HOSPITAL | Age: 65
Discharge: HOME | End: 2025-06-06
Attending: EMERGENCY MEDICINE
Payer: COMMERCIAL

## 2025-06-06 VITALS
BODY MASS INDEX: 30.49 KG/M2 | TEMPERATURE: 97.9 F | DIASTOLIC BLOOD PRESSURE: 79 MMHG | WEIGHT: 183 LBS | HEIGHT: 65 IN | SYSTOLIC BLOOD PRESSURE: 113 MMHG | RESPIRATION RATE: 16 BRPM | OXYGEN SATURATION: 100 % | HEART RATE: 104 BPM

## 2025-06-06 DIAGNOSIS — F32.A DEPRESSIVE EPISODE: Primary | ICD-10-CM

## 2025-06-06 PROCEDURE — 99283 EMERGENCY DEPT VISIT LOW MDM: CPT | Performed by: EMERGENCY MEDICINE

## 2025-06-06 PROCEDURE — 99284 EMERGENCY DEPT VISIT MOD MDM: CPT | Performed by: EMERGENCY MEDICINE

## 2025-06-06 PROCEDURE — 99285 EMERGENCY DEPT VISIT HI MDM: CPT | Performed by: EMERGENCY MEDICINE

## 2025-06-06 ASSESSMENT — COLUMBIA-SUICIDE SEVERITY RATING SCALE - C-SSRS
1. IN THE PAST MONTH, HAVE YOU WISHED YOU WERE DEAD OR WISHED YOU COULD GO TO SLEEP AND NOT WAKE UP?: NO
6. HAVE YOU EVER DONE ANYTHING, STARTED TO DO ANYTHING, OR PREPARED TO DO ANYTHING TO END YOUR LIFE?: NO
2. HAVE YOU ACTUALLY HAD ANY THOUGHTS OF KILLING YOURSELF?: NO

## 2025-06-06 ASSESSMENT — PAIN - FUNCTIONAL ASSESSMENT: PAIN_FUNCTIONAL_ASSESSMENT: 0-10

## 2025-06-06 ASSESSMENT — PAIN SCALES - GENERAL: PAINLEVEL_OUTOF10: 5 - MODERATE PAIN

## 2025-06-06 ASSESSMENT — PAIN DESCRIPTION - PAIN TYPE: TYPE: ACUTE PAIN

## 2025-06-06 NOTE — ED TRIAGE NOTES
Pt presented to ED for c/o depression for the past month. Pt is having issues feeling the live to will she states. Denies SI/HI/AH/VH.

## 2025-06-06 NOTE — ED PROVIDER NOTES
Emergency Department Provider Note        History of Present Illness     History provided by: Patient  Limitations to History: None  External Records Reviewed with Brief Summary: None    HPI:  Corinne Louise Tanks is a 65 y.o. female past medical history of bipolar, fibromyalgia, seizures presenting with depression.  Patient reports she believes she is in a depressive episode for the past month, whereas usually she is able to get out of these episodes in about a week or 2, this has been ongoing.  Reports she has no motivation to get out of bed most days and do her normal activities.  Denies any suicidal ideation.  Reports she tried to contact her psychiatrist, and was unable to reach him, and has an appointment scheduled for August.     Physical Exam   Triage vitals:  T 36.4 °C (97.5 °F)  HR 98  /83  RR 15  O2 100 % None (Room air)    General: Awake, alert, in no acute distress  CV: Regular rate, regular rhythm. Radial pulses 2+ bilaterally  Resp: Breathing non-labored, speaking in full sentences.  Clear to auscultation bilaterally  GI: Soft, non-distended, non-tender. No rebound or guarding.  MSK/Extremities: No gross bony deformities. Moving all extremities  Skin: Warm. Appropriate color  Neuro: Alert. Oriented. Face symmetric. Speech is fluent.  Gross strength and sensation intact in b/l UE and LEs  Psych: Appropriate mood and affect, endorses prolonged period of depression with anhedonia. has tangential thought, normal speech and is aware of depressive episode.  Denies any suicidal ideation or self-harm.     Medical Decision Making & ED Course   Medical Decision Makin y.o. female past medical history of bipolar, fibromyalgia, seizures presenting with depressive episode.  Patient reports this depressive episode has been ongoing for about a month now, described as losing interest in all her daily activities, and finding it difficult to get out of bed most days.  Still able to be self-sufficient,  but reports it takes a lot of effort.  Usually she is able to come out of the depressive episodes after about a week, but this time she feels like she is unable to come out of the episode.  Has been trying to get in touch with her psychiatrist, but is unable to schedule an appointment anytime soon and wants to be admitted for further evaluation and potential alteration of her psych medications.   Denies any suicidal ideations or thoughts of self-harm, and does not feel like she is an immediate danger to herself or others.  Overall she is well-appearing, no evidence of irrational thoughts.  No indication for imaging or testing at this time.  Social work and [email or web address] were consulted for inpatient options, and they were able to get a hold of the diversion crisis center, and scheduled a virtual meeting tomorrow for potential intake into their facility.  Patient felt comfortable being discharged with this plan, and was told to reach back out to [facility] if there is any other concerns.  Patient discharged in stable condition with no active suicidal ideation or thoughts, and with strict return precautions.   ----      Differential diagnoses considered include but are not limited to: Depressive episode, bipolar, suicidal ideation     Social Determinants of Health which Significantly Impact Care: None identified The following actions were taken to address these social determinants: Patient offered evaluation by [facility] and social work        Independent Result Review and Interpretation: None obtained    Chronic conditions affecting the patient's care: As documented above in [facility]    The patient was discussed with the following consultants/services: None    Care Considerations: As documented above in [facility]    ED Course:  Diagnoses as of 06/06/25 2341   Depressive episode     Disposition   As a result of the work-up, the patient was discharged home.  she was informed of her diagnosis and instructed to come back with any concerns or  worsening of condition.  she and was agreeable to the plan as discussed above.  she was given the opportunity to ask questions.  All of the patient's questions were answered.    Procedures   Procedures    Patient seen and discussed with ED attending physician.    Arjun Giron MD  Emergency Medicine      Arjun Giron MD  Resident  06/06/25 8212

## 2025-06-07 NOTE — DISCHARGE INSTRUCTIONS
You were seen emergency department for depressive episode.  We gave you a resource through the [facility] team for the [facility] which might be able to help find a psychiatrist and admit you to the facility.  Please follow-up with them in feel free to call back the number for [facility] if they are not able to get a meeting.  If you experience any suicidal ideation or have any immediate concerns please return to the emergency department

## 2025-06-09 ENCOUNTER — TELEPHONE (OUTPATIENT)
Dept: PRIMARY CARE | Facility: CLINIC | Age: 65
End: 2025-06-09
Payer: COMMERCIAL

## 2025-06-09 NOTE — TELEPHONE ENCOUNTER
Patient POA called requesting a new script for Gabapentin. POA believes she may be mismanaging her medication and plans to monitor and manage her meds but she is completely out her gabapentin and it's too early for her to fill her next script.

## 2025-06-09 NOTE — TELEPHONE ENCOUNTER
It cannot be refilled early, it is on the controlled substance list tier 5, lowest tier. I cannot affect refills out of expected time frame.

## 2025-06-11 ENCOUNTER — APPOINTMENT (OUTPATIENT)
Dept: AUDIOLOGY | Facility: CLINIC | Age: 65
End: 2025-06-11
Payer: COMMERCIAL

## 2025-06-12 PROCEDURE — RXMED WILLOW AMBULATORY MEDICATION CHARGE

## 2025-06-16 ENCOUNTER — APPOINTMENT (OUTPATIENT)
Dept: OTOLARYNGOLOGY | Facility: CLINIC | Age: 65
End: 2025-06-16
Payer: COMMERCIAL

## 2025-06-18 ENCOUNTER — TELEPHONE (OUTPATIENT)
Dept: PRIMARY CARE | Facility: CLINIC | Age: 65
End: 2025-06-18
Payer: COMMERCIAL

## 2025-06-18 NOTE — TELEPHONE ENCOUNTER
Patient called in and stated that she is in the hospital and would like a call back from you.  She stated he had questions in reference to medications that she is taking.

## 2025-06-18 NOTE — TELEPHONE ENCOUNTER
Patient called and stated that she is in the ICU and she would like to get some answers about a reduced medication. Says that she would like a call.

## 2025-06-20 ENCOUNTER — PHARMACY VISIT (OUTPATIENT)
Dept: PHARMACY | Facility: CLINIC | Age: 65
End: 2025-06-20
Payer: COMMERCIAL

## 2025-06-20 ENCOUNTER — PATIENT OUTREACH (OUTPATIENT)
Dept: PRIMARY CARE | Facility: CLINIC | Age: 65
End: 2025-06-20
Payer: COMMERCIAL

## 2025-06-20 RX ORDER — QUETIAPINE FUMARATE 200 MG/1
200 TABLET, FILM COATED ORAL NIGHTLY
COMMUNITY
Start: 2025-06-16

## 2025-06-20 RX ORDER — SERTRALINE HYDROCHLORIDE 50 MG/1
50 TABLET, FILM COATED ORAL
COMMUNITY
Start: 2025-06-20 | End: 2025-09-18

## 2025-06-20 RX ORDER — HYDROXYZINE HYDROCHLORIDE 50 MG/1
TABLET, FILM COATED ORAL
COMMUNITY
Start: 2025-06-19

## 2025-06-20 RX ORDER — GABAPENTIN 400 MG/1
2 CAPSULE ORAL 3 TIMES DAILY
COMMUNITY
Start: 2025-06-19

## 2025-06-20 NOTE — PROGRESS NOTES
"Discharge Facility: [facility]  Discharge Diagnosis: purposeful non-suicidal drug ingestion, initial encounter  Admission Date: 6/17/25  Discharge Date:  6/19/25    PCP Appointment Date: 7/3/25  Specialist Appointment Date: psychiatry 6/25/25  Hospital Encounter and Summary Linked: Yes  Hospital Encounter    See discharge assessment below for further details     Wrap Up  Wrap Up Additional Comments: Successful transitions of care outreach to patient.  Patient reports she is doing better now. She relayed that she was very unhappy with being admitted to a geriatrics/dementia unit at [facility]. States she had many bad experiences while there, although she liked the doctor who cared for her there. She was discharged from [facility] and admitted to [facility] the following day for taking too much medication.  She reports when she went to [facility] she was feeling very depressed, she wasn't eating or bathing. She feels much better now, feels new meds are helping.  Her cousin, who provides assistance to patient when needed, and her \"POA\" locked up patient's medications in a lock box so that she would not be able to take too much medication again. Her cousin comes over three times a day to give her medications.  Patient is agreeable to this arrangement.  Patient is established with [facility] for [facility], but states she has a lot of trouble leaving her house for appts, so her therapy appts have been over the phone. She reports she did not find the most recent therapist helpful and has asked for someone different. She would like to have home health care for mental health services if possible and I did let her know that [facility] provides this service. She is very interested in having this and would like her PCP to order this for her, if possible.  She also states that her cousin is a [employer] and can get paid for providing care to patient if the doctor writes a letter stating that she needs a caregiver for in home " assistance.  Patient would like this letter mailed to her and would rather not wait for her appt. PCP notified of this as well. Emphasized the importance of an appt to see her PCP for hospital dc follow up and patient agreed.  She was not able to make the 7/1/25 appt as it was morning and she needed afternoon.  Scheduled for 7/3/25 and message sent to PCP office to see if this can be moved up for TCM visit.  Patient has my contact information and is aware of my availability for any non-emergent questions or concerns. (6/20/2025 10:20 AM)    Engagement  Call Start Time: 1012 (6/20/2025 10:20 AM)    Medications  Medications reviewed with patient/caregiver?: Yes (6/20/2025 10:20 AM)  Is the patient having any side effects they believe may be caused by any medication additions or changes?: No (6/20/2025 10:20 AM)  Does the patient have all medications ordered at discharge?: Yes (6/20/2025 10:20 AM)  Care Management Interventions: Provided patient education (6/20/2025 10:20 AM)  Prescription Comments: Start: hydroxyzine 50mg every six hours as needed for anxiety;  Quetiapine 200mg daily at bedtime  Sertraline 50mg once daily  Change:   Gabapentin 400mg 2 capsules by mouth three times a day for 90 days  Stop: latuda (6/20/2025 10:20 AM)  Is the patient taking all medications as directed (includes completed medication regime)?: Yes (6/20/2025 10:20 AM)  Care Management Interventions: Provided patient education (6/20/2025 10:20 AM)  Medication Comments: Reviewed new, changed and stopped medications with patient. Patient reports her POA and her cousin, Kari, locked up her meds last night so that she could not take them wrong. Her cousin is coming over three times a day to give patient her medications.  Patient is agreeable to this arrangement. (6/20/2025 10:20 AM)    Appointments  Does the patient have a primary care provider?: Yes (6/20/2025 10:20 AM)  Care Management Interventions: Educated patient on importance of  making appointment (6/20/2025 10:20 AM)  Has the patient kept scheduled appointments due by today?: Not applicable (6/20/2025 10:20 AM)    Self Management  What is the home health agency?: n/a (6/20/2025 10:20 AM)  Has home health visited the patient within 72 hours of discharge?: Not applicable (6/20/2025 10:20 AM)    Patient Teaching  Does the patient have access to their discharge instructions?: Yes (6/20/2025 10:20 AM)  Care Management Interventions: Reviewed instructions with patient (6/20/2025 10:20 AM)  What is the patient's perception of their health status since discharge?: Improving (6/20/2025 10:20 AM)  Is the patient/caregiver able to teach back the hierarchy of who to call/visit for symptoms/problems? PCP, Specialist, Home Health nurse, Urgent Care, ED, 911: Yes (6/20/2025 10:20 AM)

## 2025-07-03 ENCOUNTER — APPOINTMENT (OUTPATIENT)
Dept: PRIMARY CARE | Facility: CLINIC | Age: 65
End: 2025-07-03
Payer: COMMERCIAL

## 2025-07-03 VITALS
WEIGHT: 178.2 LBS | HEART RATE: 109 BPM | TEMPERATURE: 97.6 F | SYSTOLIC BLOOD PRESSURE: 103 MMHG | BODY MASS INDEX: 29.65 KG/M2 | DIASTOLIC BLOOD PRESSURE: 71 MMHG

## 2025-07-03 DIAGNOSIS — E03.9 HYPOTHYROIDISM, UNSPECIFIED TYPE: ICD-10-CM

## 2025-07-03 DIAGNOSIS — T42.4X4D: ICD-10-CM

## 2025-07-03 DIAGNOSIS — G90.523 COMPLEX REGIONAL PAIN SYNDROME TYPE 1 OF BOTH LOWER EXTREMITIES: ICD-10-CM

## 2025-07-03 DIAGNOSIS — G90.50 RSD (REFLEX SYMPATHETIC DYSTROPHY): ICD-10-CM

## 2025-07-03 DIAGNOSIS — L40.9 PSORIASIS: ICD-10-CM

## 2025-07-03 DIAGNOSIS — Z09 HOSPITAL DISCHARGE FOLLOW-UP: Primary | ICD-10-CM

## 2025-07-03 PROCEDURE — G2211 COMPLEX E/M VISIT ADD ON: HCPCS | Performed by: INTERNAL MEDICINE

## 2025-07-03 PROCEDURE — 1036F TOBACCO NON-USER: CPT | Performed by: INTERNAL MEDICINE

## 2025-07-03 PROCEDURE — 99214 OFFICE O/P EST MOD 30 MIN: CPT | Performed by: INTERNAL MEDICINE

## 2025-07-03 RX ORDER — ROFLUMILAST 3 MG/G
1 CREAM TOPICAL DAILY
Qty: 10 G | Refills: 0 | COMMUNITY
Start: 2025-07-03

## 2025-07-03 RX ORDER — LEVOTHYROXINE SODIUM 25 UG/1
25 TABLET ORAL DAILY
Qty: 90 TABLET | Refills: 3 | Status: SHIPPED | OUTPATIENT
Start: 2025-07-03 | End: 2026-07-03

## 2025-07-03 RX ORDER — GABAPENTIN 800 MG/1
800 TABLET ORAL 3 TIMES DAILY
Qty: 270 TABLET | Refills: 3 | Status: SHIPPED | OUTPATIENT
Start: 2025-07-03 | End: 2026-07-03

## 2025-07-03 ASSESSMENT — ENCOUNTER SYMPTOMS
POLYDIPSIA: 0
CHILLS: 0
FEVER: 0
COUGH: 0
SHORTNESS OF BREATH: 0
PALPITATIONS: 0

## 2025-07-03 NOTE — PROGRESS NOTES
Subjective   Patient ID: Corinne Louise Tanks is a 65 y.o. female who presents for Hospital Follow-up and paperwork.    SUMMARY OF WHAT HAPPENED WHILE I WAS IN THE HOSPITAL: Patient was admitted for hypotension following an unintentional overdose on Klonopin. Patient recently had an admission at [facility] For acute anxiety and at that time was discharged on Klonopin.  No suicidal ideation or thoughts  Seen by psychiatry, medications adjusted, Klonopin discontinued  Eventually discharged home    OTHER PROBLEMS/DIAGNOSIS:  Active Problems:  Depression  PAVAN (generalized anxiety disorder)  Bipolar affective disorder, current episode depressed (HCC)  Obesity, Class I, BMI 30-34.9  Resolved Problems:  Hypotension  Clonazepam overdose of undetermined intent  Overdose of benzodiazepine, accidental or unintentional, initial encounter    OPERATIONS PERFORMED WHILE IN THE HOSPITAL: None    IMPORTANT TEST/PROCEDURES:   No procedures performed    TEST RESULTS NOT AVAILABLE AT THIS TIME:   No pending results     Discharge Disposition   Discharge Disposition: Home With Self Care     Activity When You Leave the Hospital     Resume pre-hospital activity     Additional Provider to Provider Information:   65-year-old woman with underlying bipolar disorder, generalized anxiety disorder was admitted for unintentional clonazepam overdose leading to hypotension. She was stabilized in ICU and later reviewed on the floor. Seen by psychiatry and medications adjusted, clonazepam discontinued and eventually discharged home     Since hospital  DC, multiple psychiatry appts, meds being adjusted. Anxiety still high, but in progress. No further controlled substances- benzos.     Psoriasis uncontrolled, off embril moving to [facility] soon. Provided Zoryve sample to test.     Appropriate follow-ups have been had since hospital discharge multiple visits with psychiatry.  She has a home health aide checking on her additional paperwork needed to  be completed to certify the health aide is a medical necessity.  Upon discussing this fact with the patient and I believe that the health aides have provided her significant improvement in quality of life and safety especially in the setting of medication misuse and then acting in service to administer and monitor her medication use.  I have certify that a [employer] would be of medical recommendation at this time and additional paperwork is completed as part of this encounter.           Review of Systems   Constitutional:  Negative for chills and fever.   Respiratory:  Negative for cough and shortness of breath.    Cardiovascular:  Negative for chest pain and palpitations.   Endocrine: Negative for polydipsia and polyuria.       Objective   /71   Pulse 109   Temp 36.4 °C (97.6 °F) (Temporal)   Wt 80.8 kg (178 lb 3.2 oz)   BMI 29.65 kg/m²     Physical Exam  Constitutional:       Appearance: Normal appearance.   HENT:      Head: Normocephalic and atraumatic.   Eyes:      Extraocular Movements: Extraocular movements intact.      Pupils: Pupils are equal, round, and reactive to light.   Neck:      Thyroid: No thyroid mass or thyromegaly.   Cardiovascular:      Rate and Rhythm: Normal rate and regular rhythm.   Musculoskeletal:      Cervical back: Neck supple.      Right lower leg: No edema.      Left lower leg: No edema.   Neurological:      Mental Status: She is alert.         Assessment/Plan   Assessment & Plan  Hospital discharge follow-up         Clonazepam overdose of undetermined intent, subsequent encounter         RSD (reflex sympathetic dystrophy)    Orders:    gabapentin (Neurontin) 800 mg tablet; Take 1 tablet (800 mg) by mouth 3 times a day.    Complex regional pain syndrome type 1 of both lower extremities    Orders:    gabapentin (Neurontin) 800 mg tablet; Take 1 tablet (800 mg) by mouth 3 times a day.    Hypothyroidism, unspecified type    Orders:    levothyroxine (Synthroid, Levoxyl) 25 mcg  tablet; Take 1 tablet (25 mcg) by mouth once daily.    Psoriasis    Orders:    roflumilast (Zoryve) 0.3 % cream; Apply 1 Application topically once daily.

## 2025-07-03 NOTE — LETTER
To whom it may concern, I am providing this letter on behalf of my patient Ms Corinne Tanks, date of birth 1960.  This letter is to provide confirmation of the patient's medical diagnosis and my medical recommendations as follows.  She is recently post hospital discharged from the [facility] due to accidental medication overdose with benzodiazepines.  She suffers from a high level of anxiety, complex regional sympathetic dystrophy, chronic pain disorder, chronic balance disorder, impairment of ADLs secondary to issues above.  Her ability for self-care has diminished in response to her recent hospitalization and changes in medication.  Recently, she has been provided additional home care and services from neighbors and friends assisting her with strict management of her medications keeping her medications on lockdown to prevent further accidental overdose.  They come by twice a day to administer her medications.  They also provide her with late housecleaning and meal preparation which has been a significant improvement in her quality of life and maintaining regular meals in the setting of her chronic pain and health conditions.  It is my medical opinion that the patient medically benefits from this additional care with a home aide up to 5 hours a day 5 days a week for the purposes of medication administration, light meal prep, lighthouse care, assistance with ADLs such as bathing, secondary to functional decline secondary to hospitalization, and ongoing chronic health issues including chronic pain and disorders as detailed above.  Will be my medical recommendation that she continue to benefit from these services as a provided and covered medical benefit to her.      Sincerely,          Oneil Rincon MD

## 2025-07-08 PROCEDURE — RXMED WILLOW AMBULATORY MEDICATION CHARGE

## 2025-07-09 ENCOUNTER — PHARMACY VISIT (OUTPATIENT)
Dept: PHARMACY | Facility: CLINIC | Age: 65
End: 2025-07-09
Payer: COMMERCIAL

## 2025-07-09 ENCOUNTER — TELEPHONE (OUTPATIENT)
Dept: PRIMARY CARE | Facility: CLINIC | Age: 65
End: 2025-07-09
Payer: COMMERCIAL

## 2025-07-09 DIAGNOSIS — G25.0 ESSENTIAL TREMOR: Primary | ICD-10-CM

## 2025-07-09 RX ORDER — PROPRANOLOL HYDROCHLORIDE 60 MG/1
60 CAPSULE, EXTENDED RELEASE ORAL DAILY
Qty: 90 CAPSULE | Refills: 0 | Status: SHIPPED | OUTPATIENT
Start: 2025-07-09 | End: 2025-10-07

## 2025-07-15 PROCEDURE — RXMED WILLOW AMBULATORY MEDICATION CHARGE

## 2025-07-16 ENCOUNTER — PHARMACY VISIT (OUTPATIENT)
Dept: PHARMACY | Facility: CLINIC | Age: 65
End: 2025-07-16
Payer: COMMERCIAL

## 2025-07-31 ENCOUNTER — PATIENT OUTREACH (OUTPATIENT)
Dept: PRIMARY CARE | Facility: CLINIC | Age: 65
End: 2025-07-31
Payer: COMMERCIAL

## 2025-07-31 NOTE — PROGRESS NOTES
TCM RN Outreach  Successful outreach to patient regarding hospitalization as patient continues TCM program.   At time of outreach call the patient feels as if their condition has returned to baseline since initial visit with PCP or specialist.  Pt states she is doing fine and has no questions or concerns. Reports she is taking her medications as prescribed with no difficulties.

## 2025-08-06 DIAGNOSIS — Z12.31 ENCOUNTER FOR SCREENING MAMMOGRAM FOR BREAST CANCER: ICD-10-CM

## 2025-08-15 ENCOUNTER — TELEPHONE (OUTPATIENT)
Dept: PRIMARY CARE | Facility: CLINIC | Age: 65
End: 2025-08-15
Payer: COMMERCIAL

## 2025-08-15 DIAGNOSIS — H91.93 BILATERAL HEARING LOSS, UNSPECIFIED HEARING LOSS TYPE: Primary | ICD-10-CM

## 2025-09-02 ENCOUNTER — APPOINTMENT (OUTPATIENT)
Dept: PRIMARY CARE | Facility: CLINIC | Age: 65
End: 2025-09-02
Payer: COMMERCIAL

## 2025-09-02 PROBLEM — E87.1 HYPONATREMIA: Status: RESOLVED | Noted: 2025-08-20 | Resolved: 2025-09-02

## 2025-09-02 PROBLEM — R41.0 DELIRIUM: Status: ACTIVE | Noted: 2025-08-20

## 2025-09-02 PROBLEM — E87.1 HYPONATREMIA: Status: ACTIVE | Noted: 2025-08-20

## 2025-09-02 PROBLEM — I95.9 HYPOTENSION: Status: RESOLVED | Noted: 2023-04-22 | Resolved: 2025-09-02

## 2025-09-02 PROBLEM — R41.0 DELIRIUM: Status: RESOLVED | Noted: 2025-08-20 | Resolved: 2025-09-02

## 2025-09-02 ASSESSMENT — ENCOUNTER SYMPTOMS
FEVER: 0
SHORTNESS OF BREATH: 0
CHILLS: 0
COUGH: 0
POLYDIPSIA: 0
PALPITATIONS: 0

## 2025-10-14 ENCOUNTER — APPOINTMENT (OUTPATIENT)
Dept: DERMATOLOGY | Facility: CLINIC | Age: 65
End: 2025-10-14
Payer: COMMERCIAL

## 2025-11-06 ENCOUNTER — APPOINTMENT (OUTPATIENT)
Dept: DERMATOLOGY | Facility: CLINIC | Age: 65
End: 2025-11-06
Payer: COMMERCIAL